# Patient Record
Sex: MALE | Race: BLACK OR AFRICAN AMERICAN | Employment: FULL TIME | URBAN - METROPOLITAN AREA
[De-identification: names, ages, dates, MRNs, and addresses within clinical notes are randomized per-mention and may not be internally consistent; named-entity substitution may affect disease eponyms.]

---

## 2017-01-25 ENCOUNTER — HOSPITAL ENCOUNTER (EMERGENCY)
Age: 32
Discharge: HOME | End: 2017-01-25
Attending: EMERGENCY MEDICINE
Payer: SELF-PAY

## 2017-01-25 VITALS
RESPIRATION RATE: 14 BRPM | HEART RATE: 94 BPM | BODY MASS INDEX: 32.34 KG/M2 | WEIGHT: 252 LBS | SYSTOLIC BLOOD PRESSURE: 142 MMHG | DIASTOLIC BLOOD PRESSURE: 77 MMHG | OXYGEN SATURATION: 98 % | TEMPERATURE: 97.9 F | HEIGHT: 74 IN

## 2017-01-25 DIAGNOSIS — L02.91 ABSCESS: Primary | ICD-10-CM

## 2017-01-25 PROCEDURE — 99282 EMERGENCY DEPT VISIT SF MDM: CPT

## 2017-01-25 RX ORDER — TRAMADOL HYDROCHLORIDE 50 MG/1
50 TABLET ORAL
Qty: 10 TAB | Refills: 0 | Status: SHIPPED | OUTPATIENT
Start: 2017-01-25 | End: 2017-04-23

## 2017-01-25 RX ORDER — SULFAMETHOXAZOLE AND TRIMETHOPRIM 800; 160 MG/1; MG/1
1 TABLET ORAL 2 TIMES DAILY
Qty: 14 TAB | Refills: 0 | Status: SHIPPED | OUTPATIENT
Start: 2017-01-25 | End: 2017-02-01

## 2017-01-25 NOTE — DISCHARGE INSTRUCTIONS

## 2017-01-25 NOTE — Clinical Note
- return for new or concerning symptoms; fever, chills, vomiting, increasing size - You may use over the counter ibuprofen (Motrin, Advil, Ibuprofen), naproxen (Aleve), or acetaminophen (Tylenol) at home as needed for mild pain, and prescribed pain medi cation for moderate to severe pain. Take prescribed pain medication with caution, as it may cause drowsiness. Do not take with alcohol, and do not take if you are going to be driving. Additionally, if the medication you are prescribed has Tylenol (ace taminophen)  in it, do not take additional Tylenol within 4 hours as you may ingest too much. - warm compresses 20 minutes at a time

## 2017-01-25 NOTE — ED PROVIDER NOTES
HPI Comments: 36year old male hx DM, prior skin infections presenting to the ED for abscess. Pt notes that he started with a painful \"bump\" to his left medial lower leg 4-5 days ago. A few days ago he applied pressure and drained a small amount of blood/pus, notes that since then the area around the bump has enlarged and become red and warm, reports a moderately severe throbbing pain when ambulating, non-radiating. No meds taken PTA. No F/C, N/V, CP, SOB, abdominal pain, or other systemic symptoms. PMHx: as above  Social: non-smoker. . Patient is a 36 y.o. male presenting with abscess. The history is provided by the patient and the spouse. Abscess           Past Medical History:   Diagnosis Date    Diabetes ([facility] [address]     Type II or unspecified type diabetes mellitus without mention of complication, uncontrolled 7/22/2011       History reviewed. No pertinent past surgical history. Family History:   Problem Relation Age of Onset    Hypertension Father     Diabetes Maternal Grandmother        Social History     Social History    Marital status:      Spouse name: N/A    Number of children: N/A    Years of education: N/A     Occupational History    Not on file. Social History Main Topics    Smoking status: Never Smoker    Smokeless tobacco: Never Used    Alcohol use No    Drug use: No    Sexual activity: Not on file     Other Topics Concern    Not on file     Social History Narrative    ** Merged History Encounter **              ALLERGIES: Review of patient's allergies indicates no known allergies. Review of Systems   Constitutional: Negative for chills, fatigue and fever. Eyes: Negative for discharge. Respiratory: Negative for shortness of breath. Cardiovascular: Negative for chest pain. Gastrointestinal: Negative for diarrhea and vomiting. Genitourinary: Negative for difficulty urinating. Musculoskeletal: Negative for neck stiffness.    Skin: Positive for color change and wound. Neurological: Negative for syncope. All other systems reviewed and are negative. Vitals:    01/25/17 1015   BP: 142/77   Pulse: 94   Resp: 14   Temp: 97.9 °F (36.6 °C)   SpO2: 98%   Weight: 114.3 kg (252 lb)   Height: 6' 2\" (1.88 m)            Physical Exam   Constitutional: He is oriented to person, place, and time. He appears well-developed and well-nourished. No distress. Pleasant, well-appearing AA male   HENT:   Head: Normocephalic and atraumatic. Right Ear: External ear normal.   Left Ear: External ear normal.   Eyes: Conjunctivae are normal. No scleral icterus. Neck: Neck supple. No tracheal deviation present. Cardiovascular: Normal rate, regular rhythm and normal heart sounds. Exam reveals no gallop and no friction rub. No murmur heard. Pulmonary/Chest: Effort normal and breath sounds normal. No stridor. No respiratory distress. He has no wheezes. Abdominal: Soft. He exhibits no distension. Musculoskeletal: Normal range of motion. Left medial lower leg: see photo. Small central area with scant purulent drainage, surrounding erythema and tenderness, no lymphangitis. No fluctuance. Neurological: He is alert and oriented to person, place, and time. Skin: Skin is warm and dry. Psychiatric: He has a normal mood and affect. His behavior is normal.   Nursing note and vitals reviewed. MDM  Number of Diagnoses or Management Options  Diagnosis management comments: 36year old male hx DM presenting for skin infection, hx same. Small area with drainage, induration, erythema, warmth, no fluctuance. Due to surrounding cellulitis, will treat with abx to cover for MRSA. Discussed elevation, warm compresses, return precautions.        Amount and/or Complexity of Data Reviewed  Discuss the patient with other providers: yes (Dr. Alicia Bah, ED attending)      ED Course       Procedures

## 2017-01-25 NOTE — ED TRIAGE NOTES
Triage Note:  Pt arrived for a boil on his left lower leg. \"I popped it but everyone says that I needed to come in and get Bactrim or something. \"

## 2017-01-26 ENCOUNTER — PATIENT OUTREACH (OUTPATIENT)
Dept: FAMILY MEDICINE CLINIC | Age: 32
End: 2017-01-26

## 2017-01-26 NOTE — PROGRESS NOTES
NN TOCED NOTE FOR:  Myriam Almanza, 36 y.o., 1985    Patient listed on [facility] sheet today for discharge from [facility] Ed for Abscess. NN unable to reach patient at home or cell number. Undersigned left message on voice mail with my contact information and sent \"Get In Touch\" letter. Need to assess for discharge needs. Patient has had five ED visits in last 12 months with last PCP visit at [facility] on 7/23/13. Chart routed to PCP for review.

## 2017-04-23 ENCOUNTER — APPOINTMENT (OUTPATIENT)
Dept: CT IMAGING | Age: 32
End: 2017-04-23
Attending: PHYSICIAN ASSISTANT
Payer: SELF-PAY

## 2017-04-23 ENCOUNTER — HOSPITAL ENCOUNTER (EMERGENCY)
Age: 32
Discharge: HOME | End: 2017-04-23
Attending: STUDENT IN AN ORGANIZED HEALTH CARE EDUCATION/TRAINING PROGRAM | Admitting: STUDENT IN AN ORGANIZED HEALTH CARE EDUCATION/TRAINING PROGRAM
Payer: SELF-PAY

## 2017-04-23 VITALS
HEART RATE: 90 BPM | SYSTOLIC BLOOD PRESSURE: 137 MMHG | BODY MASS INDEX: 32.88 KG/M2 | WEIGHT: 256.2 LBS | HEIGHT: 74 IN | RESPIRATION RATE: 16 BRPM | TEMPERATURE: 98.8 F | DIASTOLIC BLOOD PRESSURE: 80 MMHG | OXYGEN SATURATION: 99 %

## 2017-04-23 DIAGNOSIS — R73.9 HYPERGLYCEMIA: ICD-10-CM

## 2017-04-23 DIAGNOSIS — L03.211 FACIAL CELLULITIS: Primary | ICD-10-CM

## 2017-04-23 LAB
ANION GAP BLD CALC-SCNC: 8 MMOL/L (ref 5–15)
APPEARANCE UR: CLEAR
BACTERIA URNS QL MICRO: NEGATIVE /HPF
BASOPHILS # BLD AUTO: 0 K/UL (ref 0–0.1)
BASOPHILS # BLD: 0 % (ref 0–1)
BILIRUB UR QL: NEGATIVE
BUN SERPL-MCNC: 13 MG/DL (ref 6–20)
BUN/CREAT SERPL: 10 (ref 12–20)
CALCIUM SERPL-MCNC: 8.9 MG/DL (ref 8.5–10.1)
CHLORIDE SERPL-SCNC: 94 MMOL/L (ref 97–108)
CO2 SERPL-SCNC: 30 MMOL/L (ref 21–32)
COLOR UR: ABNORMAL
CREAT SERPL-MCNC: 1.29 MG/DL (ref 0.7–1.3)
EOSINOPHIL # BLD: 0.3 K/UL (ref 0–0.4)
EOSINOPHIL NFR BLD: 2 % (ref 0–7)
EPITH CASTS URNS QL MICRO: ABNORMAL /LPF
ERYTHROCYTE [DISTWIDTH] IN BLOOD BY AUTOMATED COUNT: 12.4 % (ref 11.5–14.5)
GLUCOSE BLD STRIP.AUTO-MCNC: 316 MG/DL (ref 65–100)
GLUCOSE SERPL-MCNC: 399 MG/DL (ref 65–100)
GLUCOSE UR STRIP.AUTO-MCNC: >1000 MG/DL
HCT VFR BLD AUTO: 46.5 % (ref 36.6–50.3)
HGB BLD-MCNC: 16.1 G/DL (ref 12.1–17)
HGB UR QL STRIP: NEGATIVE
HYALINE CASTS URNS QL MICRO: ABNORMAL /LPF (ref 0–5)
KETONES UR QL STRIP.AUTO: ABNORMAL MG/DL
LEUKOCYTE ESTERASE UR QL STRIP.AUTO: NEGATIVE
LYMPHOCYTES # BLD AUTO: 18 % (ref 12–49)
LYMPHOCYTES # BLD: 2.4 K/UL (ref 0.8–3.5)
MCH RBC QN AUTO: 27 PG (ref 26–34)
MCHC RBC AUTO-ENTMCNC: 34.6 G/DL (ref 30–36.5)
MCV RBC AUTO: 77.9 FL (ref 80–99)
MONOCYTES # BLD: 0.9 K/UL (ref 0–1)
MONOCYTES NFR BLD AUTO: 7 % (ref 5–13)
NEUTS SEG # BLD: 9.3 K/UL (ref 1.8–8)
NEUTS SEG NFR BLD AUTO: 73 % (ref 32–75)
NITRITE UR QL STRIP.AUTO: NEGATIVE
PH UR STRIP: 7 [PH] (ref 5–8)
PLATELET # BLD AUTO: 348 K/UL (ref 150–400)
POTASSIUM SERPL-SCNC: 4.1 MMOL/L (ref 3.5–5.1)
PROT UR STRIP-MCNC: ABNORMAL MG/DL
RBC # BLD AUTO: 5.97 M/UL (ref 4.1–5.7)
RBC #/AREA URNS HPF: ABNORMAL /HPF (ref 0–5)
SERVICE CMNT-IMP: ABNORMAL
SODIUM SERPL-SCNC: 132 MMOL/L (ref 136–145)
SP GR UR REFRACTOMETRY: 1.01 (ref 1–1.03)
UROBILINOGEN UR QL STRIP.AUTO: 1 EU/DL (ref 0.2–1)
WBC # BLD AUTO: 13 K/UL (ref 4.1–11.1)
WBC URNS QL MICRO: ABNORMAL /HPF (ref 0–4)

## 2017-04-23 PROCEDURE — 82962 GLUCOSE BLOOD TEST: CPT

## 2017-04-23 PROCEDURE — 74011250636 HC RX REV CODE- 250/636: Performed by: PHYSICIAN ASSISTANT

## 2017-04-23 PROCEDURE — 74011000258 HC RX REV CODE- 258: Performed by: STUDENT IN AN ORGANIZED HEALTH CARE EDUCATION/TRAINING PROGRAM

## 2017-04-23 PROCEDURE — 80048 BASIC METABOLIC PNL TOTAL CA: CPT | Performed by: PHYSICIAN ASSISTANT

## 2017-04-23 PROCEDURE — 81001 URINALYSIS AUTO W/SCOPE: CPT | Performed by: PHYSICIAN ASSISTANT

## 2017-04-23 PROCEDURE — 96361 HYDRATE IV INFUSION ADD-ON: CPT

## 2017-04-23 PROCEDURE — 36415 COLL VENOUS BLD VENIPUNCTURE: CPT | Performed by: PHYSICIAN ASSISTANT

## 2017-04-23 PROCEDURE — 74011636320 HC RX REV CODE- 636/320: Performed by: STUDENT IN AN ORGANIZED HEALTH CARE EDUCATION/TRAINING PROGRAM

## 2017-04-23 PROCEDURE — 99283 EMERGENCY DEPT VISIT LOW MDM: CPT

## 2017-04-23 PROCEDURE — 96360 HYDRATION IV INFUSION INIT: CPT

## 2017-04-23 PROCEDURE — 70487 CT MAXILLOFACIAL W/DYE: CPT

## 2017-04-23 PROCEDURE — 85025 COMPLETE CBC W/AUTO DIFF WBC: CPT | Performed by: PHYSICIAN ASSISTANT

## 2017-04-23 RX ORDER — SODIUM CHLORIDE 0.9 % (FLUSH) 0.9 %
10 SYRINGE (ML) INJECTION
Status: COMPLETED | OUTPATIENT
Start: 2017-04-23 | End: 2017-04-23

## 2017-04-23 RX ORDER — SULFAMETHOXAZOLE AND TRIMETHOPRIM 800; 160 MG/1; MG/1
1 TABLET ORAL 2 TIMES DAILY
Qty: 14 TAB | Refills: 0 | Status: SHIPPED | OUTPATIENT
Start: 2017-04-23 | End: 2017-04-30

## 2017-04-23 RX ORDER — CEPHALEXIN 500 MG/1
500 CAPSULE ORAL 4 TIMES DAILY
Qty: 28 CAP | Refills: 0 | Status: SHIPPED | OUTPATIENT
Start: 2017-04-23 | End: 2017-04-30

## 2017-04-23 RX ADMIN — SODIUM CHLORIDE 100 ML: 900 INJECTION, SOLUTION INTRAVENOUS at 18:43

## 2017-04-23 RX ADMIN — SODIUM CHLORIDE 1000 ML: 900 INJECTION, SOLUTION INTRAVENOUS at 17:59

## 2017-04-23 RX ADMIN — IOPAMIDOL 100 ML: 612 INJECTION, SOLUTION INTRAVENOUS at 18:43

## 2017-04-23 RX ADMIN — Medication 10 ML: at 18:43

## 2017-04-23 NOTE — ED TRIAGE NOTES
TRIAGE NOTE: patient reports abscess to right scalp for 4 days.  + swelling to right face/jaw that began today

## 2017-04-24 NOTE — DISCHARGE INSTRUCTIONS
Cellulitis: Care Instructions  Your Care Instructions    Cellulitis is a skin infection. It often occurs after a break in the skin from a scrape, cut, bite, or puncture, or after a rash. The doctor has checked you carefully, but problems can develop later. If you notice any problems or new symptoms, get medical treatment right away. Follow-up care is a key part of your treatment and safety. Be sure to make and go to all appointments, and call your doctor if you are having problems. It's also a good idea to know your test results and keep a list of the medicines you take. How can you care for yourself at home? · Take your antibiotics as directed. Do not stop taking them just because you feel better. You need to take the full course of antibiotics. · Prop up the infected area on pillows to reduce pain and swelling. Try to keep the area above the level of your heart as often as you can. · If your doctor told you how to care for your wound, follow your doctor's instructions. If you did not get instructions, follow this general advice:  ¨ Wash the wound with clean water 2 times a day. Don't use hydrogen peroxide or alcohol, which can slow healing. ¨ You may cover the wound with a thin layer of petroleum jelly, such as Vaseline, and a nonstick bandage. ¨ Apply more petroleum jelly and replace the bandage as needed. · Be safe with medicines. Take pain medicines exactly as directed. ¨ If the doctor gave you a prescription medicine for pain, take it as prescribed. ¨ If you are not taking a prescription pain medicine, ask your doctor if you can take an over-the-counter medicine. To prevent cellulitis in the future  · Try to prevent cuts, scrapes, or other injuries to your skin. Cellulitis most often occurs where there is a break in the skin. · If you get a scrape, cut, mild burn, or bite, wash the wound with clean water as soon as you can to help avoid infection.  Don't use hydrogen peroxide or alcohol, which can slow healing. · If you have swelling in your legs (edema), support stockings and good skin care may help prevent leg sores and cellulitis. · Take care of your feet, especially if you have diabetes or other conditions that increase the risk of infection. Wear shoes and socks. Do not go barefoot. If you have athlete's foot or other skin problems on your feet, talk to your doctor about how to treat them. When should you call for help? Call your doctor now or seek immediate medical care if:  · You have signs that your infection is getting worse, such as:  ¨ Increased pain, swelling, warmth, or redness. ¨ Red streaks leading from the area. ¨ Pus draining from the area. ¨ A fever. · You get a rash. Watch closely for changes in your health, and be sure to contact your doctor if:  · You are not getting better after 1 day (24 hours). · You do not get better as expected. Where can you learn more? Go to http://serjio-néstor.info/. Howie Doe in the search box to learn more about \"Cellulitis: Care Instructions. \"  Current as of: October 13, 2016  Content Version: 11.2  © 6056-2601 [employer]. Care instructions adapted under license by [employer] (which disclaims liability or warranty for this information). If you have questions about a medical condition or this instruction, always ask your healthcare professional. [place] [address] any warranty or liability for your use of this information. Learning About High Blood Sugar  What is high blood sugar? Your body turns the food you eat into glucose (sugar), which it uses for energy. But if your body isn't able to use the sugar right away, it can build up in your blood and lead to high blood sugar. When the amount of sugar in your blood stays too high for too much of the time, you may have diabetes. Diabetes is a disease that can cause serious health problems.   The good news is that lifestyle changes may help you get your blood sugar back to normal and avoid or delay diabetes. What causes high blood sugar? Sugar (glucose) can build up in your blood if you:  · Are overweight. · Have a family history of diabetes. · Take certain medicines, such as steroids. What are the symptoms? Having high blood sugar may not cause any symptoms at all. Or it may make you feel very thirsty or very hungry. You may also urinate more often than usual, have blurry vision, or lose weight without trying. How is high blood sugar treated? You can take steps to lower your blood sugar level if you understand what makes it get higher. Your doctor may want you to learn how to test your blood sugar level at home. Then you can see how illness, stress, or different kinds of food or medicine raise or lower your blood sugar level. Other tests may be needed to see if you have diabetes. How can you prevent high blood sugar? · Watch your weight. If you're overweight, losing just a small amount of weight may help. Reducing fat around your waist is most important. · Limit the amount of calories, sweets, and unhealthy fat you eat. Ask your doctor if a dietitian can help you. A registered dietitian can help you create meal plans that fit your lifestyle. · Get at least 30 minutes of exercise on most days of the week. Exercise helps control your blood sugar. It also helps you maintain a healthy weight. Walking is a good choice. You also may want to do other activities, such as running, swimming, cycling, or playing tennis or team sports. · If your doctor prescribed medicines, take them exactly as prescribed. Call your doctor if you think you are having a problem with your medicine. You will get more details on the specific medicines your doctor prescribes. Follow-up care is a key part of your treatment and safety. Be sure to make and go to all appointments, and call your doctor if you are having problems.  It's also a good idea to know your test results and keep a list of the medicines you take. Where can you learn more? Go to http://serjio-néstor.info/. Enter O108 in the search box to learn more about \"Learning About High Blood Sugar. \"  Current as of: May 23, 2016  Content Version: 11.2  © 8044-8543 [employer]. Care instructions adapted under license by [employer] (which disclaims liability or warranty for this information). If you have questions about a medical condition or this instruction, always ask your healthcare professional. [address] any warranty or liability for your use of this information. We hope that we have addressed all of your medical concerns. The examination and treatment you received in the Emergency Department were for an emergent problem and were not intended as complete care. It is important that you follow up with your healthcare provider(s) for ongoing care. If your symptoms worsen or do not improve as expected, and you are unable to reach your usual health care provider(s), you should return to the Emergency Department. Today's healthcare is undergoing tremendous change, and patient satisfaction surveys are one of the many tools to assess the quality of medical care. You may receive a survey from the [employer] regarding your experience in the Emergency Department. I hope that your experience has been completely positive, particularly the medical care that I provided. As such, please participate in the survey; anything less than excellent does not meet my expectations or intentions. [facility] and [address] [place] [address] participate in nationally recognized quality of care measures. If your blood pressure is greater than 120/80, as reported below, we urge that you seek medical care to address the potential of high blood pressure, commonly known as hypertension.    Hypertension can be hereditary or can be caused by certain medical conditions, pain, stress, or \"white coat syndrome. \"       Please make an appointment with your health care provider(s) for follow up of your Emergency Department visit. VITALS:   Patient Vitals for the past 8 hrs:   Temp Pulse Resp BP SpO2   04/23/17 2005 98.8 °F (37.1 °C) 90 16 137/80 -   04/23/17 1639 98 °F (36.7 °C) 100 16 155/87 99 %          Thank you for allowing us to provide you with medical care today. We realize that you have many choices for your emergency care needs. Please choose us in the future for any continued health care needs. Zuleika Pierce[address].   Office: [phone]            Recent Results (from the past 24 hour(s))   CBC WITH AUTOMATED DIFF    Collection Time: 04/23/17  5:36 PM   Result Value Ref Range    WBC 13.0 (H) 4.1 - 11.1 K/uL    RBC 5.97 (H) 4.10 - 5.70 M/uL    HGB 16.1 12.1 - 17.0 g/dL    HCT 46.5 36.6 - 50.3 %    MCV 77.9 (L) 80.0 - 99.0 FL    MCH 27.0 26.0 - 34.0 PG    MCHC 34.6 30.0 - 36.5 g/dL    RDW 12.4 11.5 - 14.5 %    PLATELET 242 072 - 519 K/uL    NEUTROPHILS 73 32 - 75 %    LYMPHOCYTES 18 12 - 49 %    MONOCYTES 7 5 - 13 %    EOSINOPHILS 2 0 - 7 %    BASOPHILS 0 0 - 1 %    ABS. NEUTROPHILS 9.3 (H) 1.8 - 8.0 K/UL    ABS. LYMPHOCYTES 2.4 0.8 - 3.5 K/UL    ABS. MONOCYTES 0.9 0.0 - 1.0 K/UL    ABS. EOSINOPHILS 0.3 0.0 - 0.4 K/UL    ABS.  BASOPHILS 0.0 0.0 - 0.1 K/UL   METABOLIC PANEL, BASIC    Collection Time: 04/23/17  5:36 PM   Result Value Ref Range    Sodium 132 (L) 136 - 145 mmol/L    Potassium 4.1 3.5 - 5.1 mmol/L    Chloride 94 (L) 97 - 108 mmol/L    CO2 30 21 - 32 mmol/L    Anion gap 8 5 - 15 mmol/L    Glucose 399 (H) 65 - 100 mg/dL    BUN 13 6 - 20 MG/DL    Creatinine 1.29 0.70 - 1.30 MG/DL    BUN/Creatinine ratio 10 (L) 12 - 20      GFR est AA >60 >60 ml/min/1.73m2    GFR est non-AA >60 >60 ml/min/1.73m2    Calcium 8.9 8.5 - 10.1 MG/DL   GLUCOSE, POC Collection Time: 04/23/17  7:31 PM   Result Value Ref Range    Glucose (POC) 316 (H) 65 - 100 mg/dL    Performed by Parul Bennett W/MICROSCOPIC    Collection Time: 04/23/17  7:43 PM   Result Value Ref Range    Color YELLOW/STRAW      Appearance CLEAR CLEAR      Specific gravity 1.010 1.003 - 1.030      pH (UA) 7.0 5.0 - 8.0      Protein TRACE (A) NEG mg/dL    Glucose >1000 (A) NEG mg/dL    Ketone TRACE (A) NEG mg/dL    Bilirubin NEGATIVE  NEG      Blood NEGATIVE  NEG      Urobilinogen 1.0 0.2 - 1.0 EU/dL    Nitrites NEGATIVE  NEG      Leukocyte Esterase NEGATIVE  NEG      WBC 0-4 0 - 4 /hpf    RBC 0-5 0 - 5 /hpf    Epithelial cells FEW FEW /lpf    Bacteria NEGATIVE  NEG /hpf    Hyaline cast 0-2 0 - 5 /lpf       Ct Maxillofacial W Cont    Result Date: 4/23/2017  EXAM:  CT MAXILLOFACIAL W CONT INDICATION:   Abscess of right scalp with swelling of the right face and jaw. COMPARISON:  None. CONTRAST:   100 mL of Isovue-300 TECHNIQUE:  Multislice helical CT of the facial bones was performed in the axial plane during uneventful rapid bolus intravenous contrast administration. Coronal and sagittal reformations were generated. CT dose reduction was achieved through use of a standardized protocol tailored for this examination and automatic exposure control for dose modulation. Adaptive statistical iterative reconstruction (ASIR) was utilized. FINDINGS: There is some mild soft tissue edema in the right face with no collection or soft tissue mass. There is no facial fracture or other osseous abnormality. The visualized paranasal sinuses and mastoid air cells are clear. The globes, optic nerves and extraocular muscles are normal. No abnormalities are identified within the visualized portions of the brain or nasopharynx. IMPRESSION: Right facial edema compatible with cellulitis with no abscess or other abnormality.

## 2020-06-24 ENCOUNTER — HOSPITAL ENCOUNTER (OUTPATIENT)
Age: 35
Setting detail: OBSERVATION
Discharge: HOME | End: 2020-06-27
Attending: EMERGENCY MEDICINE | Admitting: SURGERY
Payer: SELF-PAY

## 2020-06-24 DIAGNOSIS — K35.30 ACUTE APPENDICITIS WITH LOCALIZED PERITONITIS, WITHOUT PERFORATION, ABSCESS, OR GANGRENE: Primary | ICD-10-CM

## 2020-06-24 LAB
ANION GAP SERPL CALC-SCNC: 7 MMOL/L (ref 5–15)
BASOPHILS # BLD: 0.1 K/UL (ref 0–0.1)
BASOPHILS NFR BLD: 0 % (ref 0–1)
BUN SERPL-MCNC: 8 MG/DL (ref 6–20)
BUN/CREAT SERPL: 9 (ref 12–20)
CALCIUM SERPL-MCNC: 9.3 MG/DL (ref 8.5–10.1)
CHLORIDE SERPL-SCNC: 100 MMOL/L (ref 97–108)
CO2 SERPL-SCNC: 26 MMOL/L (ref 21–32)
COMMENT, HOLDF: NORMAL
CREAT SERPL-MCNC: 0.94 MG/DL (ref 0.7–1.3)
DIFFERENTIAL METHOD BLD: ABNORMAL
EOSINOPHIL # BLD: 0 K/UL (ref 0–0.4)
EOSINOPHIL NFR BLD: 0 % (ref 0–7)
ERYTHROCYTE [DISTWIDTH] IN BLOOD BY AUTOMATED COUNT: 11.9 % (ref 11.5–14.5)
GLUCOSE SERPL-MCNC: 236 MG/DL (ref 65–100)
HCT VFR BLD AUTO: 46 % (ref 36.6–50.3)
HGB BLD-MCNC: 14.9 G/DL (ref 12.1–17)
IMM GRANULOCYTES # BLD AUTO: 0.1 K/UL (ref 0–0.04)
IMM GRANULOCYTES NFR BLD AUTO: 1 % (ref 0–0.5)
LIPASE SERPL-CCNC: 87 U/L (ref 73–393)
LYMPHOCYTES # BLD: 1.4 K/UL (ref 0.8–3.5)
LYMPHOCYTES NFR BLD: 6 % (ref 12–49)
MCH RBC QN AUTO: 26.5 PG (ref 26–34)
MCHC RBC AUTO-ENTMCNC: 32.4 G/DL (ref 30–36.5)
MCV RBC AUTO: 81.7 FL (ref 80–99)
MONOCYTES # BLD: 1.3 K/UL (ref 0–1)
MONOCYTES NFR BLD: 6 % (ref 5–13)
NEUTS SEG # BLD: 19.4 K/UL (ref 1.8–8)
NEUTS SEG NFR BLD: 87 % (ref 32–75)
NRBC # BLD: 0 K/UL (ref 0–0.01)
NRBC BLD-RTO: 0 PER 100 WBC
PLATELET # BLD AUTO: 336 K/UL (ref 150–400)
PMV BLD AUTO: 10.9 FL (ref 8.9–12.9)
POTASSIUM SERPL-SCNC: 3.7 MMOL/L (ref 3.5–5.1)
RBC # BLD AUTO: 5.63 M/UL (ref 4.1–5.7)
SAMPLES BEING HELD,HOLD: NORMAL
SODIUM SERPL-SCNC: 133 MMOL/L (ref 136–145)
WBC # BLD AUTO: 22.3 K/UL (ref 4.1–11.1)

## 2020-06-24 PROCEDURE — 96375 TX/PRO/DX INJ NEW DRUG ADDON: CPT

## 2020-06-24 PROCEDURE — 83036 HEMOGLOBIN GLYCOSYLATED A1C: CPT

## 2020-06-24 PROCEDURE — 99284 EMERGENCY DEPT VISIT MOD MDM: CPT

## 2020-06-24 PROCEDURE — 96374 THER/PROPH/DIAG INJ IV PUSH: CPT

## 2020-06-24 PROCEDURE — 80048 BASIC METABOLIC PNL TOTAL CA: CPT

## 2020-06-24 PROCEDURE — 74011250636 HC RX REV CODE- 250/636: Performed by: EMERGENCY MEDICINE

## 2020-06-24 PROCEDURE — 85025 COMPLETE CBC W/AUTO DIFF WBC: CPT

## 2020-06-24 PROCEDURE — 36415 COLL VENOUS BLD VENIPUNCTURE: CPT

## 2020-06-24 PROCEDURE — 83690 ASSAY OF LIPASE: CPT

## 2020-06-24 RX ORDER — ONDANSETRON 2 MG/ML
4 INJECTION INTRAMUSCULAR; INTRAVENOUS
Status: COMPLETED | OUTPATIENT
Start: 2020-06-24 | End: 2020-06-24

## 2020-06-24 RX ADMIN — ONDANSETRON 4 MG: 2 INJECTION INTRAMUSCULAR; INTRAVENOUS at 23:41

## 2020-06-24 RX ADMIN — SODIUM CHLORIDE 1000 ML: 900 INJECTION, SOLUTION INTRAVENOUS at 23:40

## 2020-06-25 ENCOUNTER — ANESTHESIA EVENT (OUTPATIENT)
Dept: SURGERY | Age: 35
End: 2020-06-25
Payer: SELF-PAY

## 2020-06-25 ENCOUNTER — ANESTHESIA (OUTPATIENT)
Dept: SURGERY | Age: 35
End: 2020-06-25
Payer: SELF-PAY

## 2020-06-25 ENCOUNTER — APPOINTMENT (OUTPATIENT)
Dept: CT IMAGING | Age: 35
End: 2020-06-25
Attending: STUDENT IN AN ORGANIZED HEALTH CARE EDUCATION/TRAINING PROGRAM
Payer: SELF-PAY

## 2020-06-25 PROBLEM — K37 APPENDICITIS: Status: ACTIVE | Noted: 2020-06-25

## 2020-06-25 LAB
APPEARANCE UR: CLEAR
BACTERIA URNS QL MICRO: ABNORMAL /HPF
BILIRUB UR QL: NEGATIVE
COLOR UR: ABNORMAL
EPITH CASTS URNS QL MICRO: ABNORMAL /LPF
EST. AVERAGE GLUCOSE BLD GHB EST-MCNC: 278 MG/DL
GLUCOSE BLD STRIP.AUTO-MCNC: 197 MG/DL (ref 65–100)
GLUCOSE BLD STRIP.AUTO-MCNC: 212 MG/DL (ref 65–100)
GLUCOSE BLD STRIP.AUTO-MCNC: 213 MG/DL (ref 65–100)
GLUCOSE BLD STRIP.AUTO-MCNC: 253 MG/DL (ref 65–100)
GLUCOSE UR STRIP.AUTO-MCNC: NEGATIVE MG/DL
HBA1C MFR BLD: 11.3 % (ref 4–5.6)
HGB UR QL STRIP: NEGATIVE
HYALINE CASTS URNS QL MICRO: ABNORMAL /LPF (ref 0–5)
KETONES UR QL STRIP.AUTO: ABNORMAL MG/DL
LEUKOCYTE ESTERASE UR QL STRIP.AUTO: NEGATIVE
NITRITE UR QL STRIP.AUTO: NEGATIVE
PH UR STRIP: 6.5 [PH] (ref 5–8)
PROT UR STRIP-MCNC: 30 MG/DL
RBC #/AREA URNS HPF: ABNORMAL /HPF (ref 0–5)
SERVICE CMNT-IMP: ABNORMAL
SP GR UR REFRACTOMETRY: 1.02 (ref 1–1.03)
UR CULT HOLD, URHOLD: NORMAL
UROBILINOGEN UR QL STRIP.AUTO: 1 EU/DL (ref 0.2–1)
WBC URNS QL MICRO: ABNORMAL /HPF (ref 0–4)

## 2020-06-25 PROCEDURE — 74011250636 HC RX REV CODE- 250/636: Performed by: NURSE ANESTHETIST, CERTIFIED REGISTERED

## 2020-06-25 PROCEDURE — 77030008608 HC TRCR ENDOSC SMTH AMR -B: Performed by: SURGERY

## 2020-06-25 PROCEDURE — 77030009963 HC RELD STPLR ECR J&J -C: Performed by: SURGERY

## 2020-06-25 PROCEDURE — 76010000153 HC OR TIME 1.5 TO 2 HR: Performed by: SURGERY

## 2020-06-25 PROCEDURE — 99218 HC RM OBSERVATION: CPT

## 2020-06-25 PROCEDURE — 74011000250 HC RX REV CODE- 250: Performed by: NURSE ANESTHETIST, CERTIFIED REGISTERED

## 2020-06-25 PROCEDURE — 96375 TX/PRO/DX INJ NEW DRUG ADDON: CPT

## 2020-06-25 PROCEDURE — 74011000258 HC RX REV CODE- 258: Performed by: RADIOLOGY

## 2020-06-25 PROCEDURE — 88304 TISSUE EXAM BY PATHOLOGIST: CPT

## 2020-06-25 PROCEDURE — 74011250636 HC RX REV CODE- 250/636: Performed by: SURGERY

## 2020-06-25 PROCEDURE — 77030003578 HC NDL INSUF VERES AMR -B: Performed by: SURGERY

## 2020-06-25 PROCEDURE — 74011000250 HC RX REV CODE- 250: Performed by: SURGERY

## 2020-06-25 PROCEDURE — 82962 GLUCOSE BLOOD TEST: CPT

## 2020-06-25 PROCEDURE — 77030002967 HC SUT PDS J&J -B: Performed by: SURGERY

## 2020-06-25 PROCEDURE — 77030008756 HC TU IRR SUC STRY -B: Performed by: SURGERY

## 2020-06-25 PROCEDURE — 74177 CT ABD & PELVIS W/CONTRAST: CPT

## 2020-06-25 PROCEDURE — 81001 URINALYSIS AUTO W/SCOPE: CPT

## 2020-06-25 PROCEDURE — 77030011640 HC PAD GRND REM COVD -A: Performed by: SURGERY

## 2020-06-25 PROCEDURE — 77030002933 HC SUT MCRYL J&J -A: Performed by: SURGERY

## 2020-06-25 PROCEDURE — 77030040830 HC CATH URETH FOL MDII -A: Performed by: SURGERY

## 2020-06-25 PROCEDURE — 74011000258 HC RX REV CODE- 258: Performed by: STUDENT IN AN ORGANIZED HEALTH CARE EDUCATION/TRAINING PROGRAM

## 2020-06-25 PROCEDURE — 77030008606 HC TRCR ENDOSC KII AMR -B: Performed by: SURGERY

## 2020-06-25 PROCEDURE — 77030032230 HC DSCTR ULTSONC CRDLSS COVD -E: Performed by: SURGERY

## 2020-06-25 PROCEDURE — 96365 THER/PROPH/DIAG IV INF INIT: CPT

## 2020-06-25 PROCEDURE — 77030026438 HC STYL ET INTUB CARD -A: Performed by: ANESTHESIOLOGY

## 2020-06-25 PROCEDURE — 74011250636 HC RX REV CODE- 250/636: Performed by: STUDENT IN AN ORGANIZED HEALTH CARE EDUCATION/TRAINING PROGRAM

## 2020-06-25 PROCEDURE — 77030008684 HC TU ET CUF COVD -B: Performed by: ANESTHESIOLOGY

## 2020-06-25 PROCEDURE — 76060000034 HC ANESTHESIA 1.5 TO 2 HR: Performed by: SURGERY

## 2020-06-25 PROCEDURE — 74011250637 HC RX REV CODE- 250/637: Performed by: EMERGENCY MEDICINE

## 2020-06-25 PROCEDURE — 76210000016 HC OR PH I REC 1 TO 1.5 HR: Performed by: SURGERY

## 2020-06-25 PROCEDURE — 77030020829: Performed by: SURGERY

## 2020-06-25 PROCEDURE — 77030020263 HC SOL INJ SOD CL0.9% LFCR 1000ML: Performed by: SURGERY

## 2020-06-25 PROCEDURE — 77030040361 HC SLV COMPR DVT MDII -B: Performed by: SURGERY

## 2020-06-25 PROCEDURE — 77030007955 HC PCH ENDOSC SPEC J&J -B: Performed by: SURGERY

## 2020-06-25 PROCEDURE — 77030027876 HC STPLR ENDOSC FLX PWR J&J -G1: Performed by: SURGERY

## 2020-06-25 PROCEDURE — 74011636320 HC RX REV CODE- 636/320: Performed by: RADIOLOGY

## 2020-06-25 PROCEDURE — 74011250636 HC RX REV CODE- 250/636: Performed by: EMERGENCY MEDICINE

## 2020-06-25 RX ORDER — INSULIN LISPRO 100 [IU]/ML
INJECTION, SOLUTION INTRAVENOUS; SUBCUTANEOUS EVERY 6 HOURS
Status: DISCONTINUED | OUTPATIENT
Start: 2020-06-25 | End: 2020-06-27 | Stop reason: HOSPADM

## 2020-06-25 RX ORDER — BUPIVACAINE HYDROCHLORIDE AND EPINEPHRINE 2.5; 5 MG/ML; UG/ML
INJECTION, SOLUTION EPIDURAL; INFILTRATION; INTRACAUDAL; PERINEURAL AS NEEDED
Status: DISCONTINUED | OUTPATIENT
Start: 2020-06-25 | End: 2020-06-25 | Stop reason: HOSPADM

## 2020-06-25 RX ORDER — SUCCINYLCHOLINE CHLORIDE 20 MG/ML
INJECTION INTRAMUSCULAR; INTRAVENOUS AS NEEDED
Status: DISCONTINUED | OUTPATIENT
Start: 2020-06-25 | End: 2020-06-25 | Stop reason: HOSPADM

## 2020-06-25 RX ORDER — SODIUM CHLORIDE 0.9 % (FLUSH) 0.9 %
5-40 SYRINGE (ML) INJECTION EVERY 8 HOURS
Status: CANCELLED | OUTPATIENT
Start: 2020-06-25

## 2020-06-25 RX ORDER — FENTANYL CITRATE 50 UG/ML
25 INJECTION, SOLUTION INTRAMUSCULAR; INTRAVENOUS
Status: CANCELLED | OUTPATIENT
Start: 2020-06-25

## 2020-06-25 RX ORDER — SODIUM CHLORIDE, SODIUM LACTATE, POTASSIUM CHLORIDE, CALCIUM CHLORIDE 600; 310; 30; 20 MG/100ML; MG/100ML; MG/100ML; MG/100ML
100 INJECTION, SOLUTION INTRAVENOUS CONTINUOUS
Status: DISCONTINUED | OUTPATIENT
Start: 2020-06-25 | End: 2020-06-26

## 2020-06-25 RX ORDER — HYDROMORPHONE HYDROCHLORIDE 2 MG/ML
INJECTION, SOLUTION INTRAMUSCULAR; INTRAVENOUS; SUBCUTANEOUS AS NEEDED
Status: DISCONTINUED | OUTPATIENT
Start: 2020-06-25 | End: 2020-06-25 | Stop reason: HOSPADM

## 2020-06-25 RX ORDER — ACETAMINOPHEN 325 MG/1
650 TABLET ORAL ONCE
Status: DISCONTINUED | OUTPATIENT
Start: 2020-06-25 | End: 2020-06-25 | Stop reason: HOSPADM

## 2020-06-25 RX ORDER — ROCURONIUM BROMIDE 10 MG/ML
INJECTION, SOLUTION INTRAVENOUS AS NEEDED
Status: DISCONTINUED | OUTPATIENT
Start: 2020-06-25 | End: 2020-06-25 | Stop reason: HOSPADM

## 2020-06-25 RX ORDER — AMOXICILLIN AND CLAVULANATE POTASSIUM 875; 125 MG/1; MG/1
1 TABLET, FILM COATED ORAL 2 TIMES DAILY
Qty: 20 TAB | Refills: 0 | Status: SHIPPED | OUTPATIENT
Start: 2020-06-25 | End: 2020-07-05

## 2020-06-25 RX ORDER — LIDOCAINE HYDROCHLORIDE 10 MG/ML
0.1 INJECTION, SOLUTION EPIDURAL; INFILTRATION; INTRACAUDAL; PERINEURAL AS NEEDED
Status: DISCONTINUED | OUTPATIENT
Start: 2020-06-25 | End: 2020-06-25 | Stop reason: HOSPADM

## 2020-06-25 RX ORDER — MAGNESIUM SULFATE 100 %
4 CRYSTALS MISCELLANEOUS AS NEEDED
Status: DISCONTINUED | OUTPATIENT
Start: 2020-06-25 | End: 2020-06-27 | Stop reason: HOSPADM

## 2020-06-25 RX ORDER — MIDAZOLAM HYDROCHLORIDE 1 MG/ML
INJECTION, SOLUTION INTRAMUSCULAR; INTRAVENOUS AS NEEDED
Status: DISCONTINUED | OUTPATIENT
Start: 2020-06-25 | End: 2020-06-25 | Stop reason: HOSPADM

## 2020-06-25 RX ORDER — DEXAMETHASONE SODIUM PHOSPHATE 4 MG/ML
INJECTION, SOLUTION INTRA-ARTICULAR; INTRALESIONAL; INTRAMUSCULAR; INTRAVENOUS; SOFT TISSUE AS NEEDED
Status: DISCONTINUED | OUTPATIENT
Start: 2020-06-25 | End: 2020-06-25 | Stop reason: HOSPADM

## 2020-06-25 RX ORDER — ONDANSETRON 2 MG/ML
4 INJECTION INTRAMUSCULAR; INTRAVENOUS AS NEEDED
Status: CANCELLED | OUTPATIENT
Start: 2020-06-25

## 2020-06-25 RX ORDER — OXYCODONE AND ACETAMINOPHEN 5; 325 MG/1; MG/1
1-2 TABLET ORAL
Qty: 20 TAB | Refills: 0 | Status: SHIPPED | OUTPATIENT
Start: 2020-06-25 | End: 2020-06-25

## 2020-06-25 RX ORDER — MIDAZOLAM HYDROCHLORIDE 1 MG/ML
1 INJECTION, SOLUTION INTRAMUSCULAR; INTRAVENOUS AS NEEDED
Status: DISCONTINUED | OUTPATIENT
Start: 2020-06-25 | End: 2020-06-25 | Stop reason: HOSPADM

## 2020-06-25 RX ORDER — ONDANSETRON 2 MG/ML
INJECTION INTRAMUSCULAR; INTRAVENOUS AS NEEDED
Status: DISCONTINUED | OUTPATIENT
Start: 2020-06-25 | End: 2020-06-25 | Stop reason: HOSPADM

## 2020-06-25 RX ORDER — HYDROMORPHONE HYDROCHLORIDE 1 MG/ML
1 INJECTION, SOLUTION INTRAMUSCULAR; INTRAVENOUS; SUBCUTANEOUS
Status: DISCONTINUED | OUTPATIENT
Start: 2020-06-25 | End: 2020-06-27 | Stop reason: HOSPADM

## 2020-06-25 RX ORDER — LIDOCAINE HYDROCHLORIDE 20 MG/ML
INJECTION, SOLUTION EPIDURAL; INFILTRATION; INTRACAUDAL; PERINEURAL AS NEEDED
Status: DISCONTINUED | OUTPATIENT
Start: 2020-06-25 | End: 2020-06-25 | Stop reason: HOSPADM

## 2020-06-25 RX ORDER — OXYCODONE AND ACETAMINOPHEN 5; 325 MG/1; MG/1
1-2 TABLET ORAL
Qty: 20 TAB | Refills: 0 | Status: SHIPPED | OUTPATIENT
Start: 2020-06-25 | End: 2020-06-29

## 2020-06-25 RX ORDER — HYDROMORPHONE HYDROCHLORIDE 1 MG/ML
0.5 INJECTION, SOLUTION INTRAMUSCULAR; INTRAVENOUS; SUBCUTANEOUS
Status: DISCONTINUED | OUTPATIENT
Start: 2020-06-25 | End: 2020-06-26

## 2020-06-25 RX ORDER — SODIUM CHLORIDE, SODIUM LACTATE, POTASSIUM CHLORIDE, CALCIUM CHLORIDE 600; 310; 30; 20 MG/100ML; MG/100ML; MG/100ML; MG/100ML
50 INJECTION, SOLUTION INTRAVENOUS CONTINUOUS
Status: DISCONTINUED | OUTPATIENT
Start: 2020-06-25 | End: 2020-06-25 | Stop reason: HOSPADM

## 2020-06-25 RX ORDER — DEXMEDETOMIDINE HYDROCHLORIDE 100 UG/ML
INJECTION, SOLUTION INTRAVENOUS AS NEEDED
Status: DISCONTINUED | OUTPATIENT
Start: 2020-06-25 | End: 2020-06-25 | Stop reason: HOSPADM

## 2020-06-25 RX ORDER — PROPOFOL 10 MG/ML
INJECTION, EMULSION INTRAVENOUS AS NEEDED
Status: DISCONTINUED | OUTPATIENT
Start: 2020-06-25 | End: 2020-06-25 | Stop reason: HOSPADM

## 2020-06-25 RX ORDER — AMOXICILLIN AND CLAVULANATE POTASSIUM 875; 125 MG/1; MG/1
1 TABLET, FILM COATED ORAL 2 TIMES DAILY
Qty: 14 TAB | Refills: 0 | Status: SHIPPED | OUTPATIENT
Start: 2020-06-25 | End: 2020-06-25

## 2020-06-25 RX ORDER — HYDROMORPHONE HYDROCHLORIDE 1 MG/ML
1 INJECTION, SOLUTION INTRAMUSCULAR; INTRAVENOUS; SUBCUTANEOUS ONCE
Status: COMPLETED | OUTPATIENT
Start: 2020-06-25 | End: 2020-06-25

## 2020-06-25 RX ORDER — HYDROMORPHONE HYDROCHLORIDE 1 MG/ML
0.2 INJECTION, SOLUTION INTRAMUSCULAR; INTRAVENOUS; SUBCUTANEOUS
Status: CANCELLED | OUTPATIENT
Start: 2020-06-25

## 2020-06-25 RX ORDER — FENTANYL CITRATE 50 UG/ML
50 INJECTION, SOLUTION INTRAMUSCULAR; INTRAVENOUS AS NEEDED
Status: DISCONTINUED | OUTPATIENT
Start: 2020-06-25 | End: 2020-06-25 | Stop reason: HOSPADM

## 2020-06-25 RX ORDER — GLYCOPYRROLATE 0.2 MG/ML
INJECTION INTRAMUSCULAR; INTRAVENOUS AS NEEDED
Status: DISCONTINUED | OUTPATIENT
Start: 2020-06-25 | End: 2020-06-25 | Stop reason: HOSPADM

## 2020-06-25 RX ORDER — SODIUM CHLORIDE 0.9 % (FLUSH) 0.9 %
5-40 SYRINGE (ML) INJECTION AS NEEDED
Status: CANCELLED | OUTPATIENT
Start: 2020-06-25

## 2020-06-25 RX ORDER — ACETAMINOPHEN 500 MG
1000 TABLET ORAL
Status: COMPLETED | OUTPATIENT
Start: 2020-06-25 | End: 2020-06-25

## 2020-06-25 RX ORDER — NEOSTIGMINE METHYLSULFATE 1 MG/ML
INJECTION, SOLUTION INTRAVENOUS AS NEEDED
Status: DISCONTINUED | OUTPATIENT
Start: 2020-06-25 | End: 2020-06-25 | Stop reason: HOSPADM

## 2020-06-25 RX ORDER — SODIUM CHLORIDE 0.9 % (FLUSH) 0.9 %
5-40 SYRINGE (ML) INJECTION EVERY 8 HOURS
Status: DISCONTINUED | OUTPATIENT
Start: 2020-06-25 | End: 2020-06-25 | Stop reason: HOSPADM

## 2020-06-25 RX ORDER — ONDANSETRON 2 MG/ML
4 INJECTION INTRAMUSCULAR; INTRAVENOUS
Status: DISCONTINUED | OUTPATIENT
Start: 2020-06-25 | End: 2020-06-27 | Stop reason: HOSPADM

## 2020-06-25 RX ORDER — OXYCODONE AND ACETAMINOPHEN 5; 325 MG/1; MG/1
1-2 TABLET ORAL
Status: DISCONTINUED | OUTPATIENT
Start: 2020-06-25 | End: 2020-06-27 | Stop reason: HOSPADM

## 2020-06-25 RX ORDER — KETOROLAC TROMETHAMINE 30 MG/ML
30 INJECTION, SOLUTION INTRAMUSCULAR; INTRAVENOUS EVERY 6 HOURS
Status: DISCONTINUED | OUTPATIENT
Start: 2020-06-25 | End: 2020-06-27 | Stop reason: HOSPADM

## 2020-06-25 RX ORDER — ACETAMINOPHEN 325 MG/1
650 TABLET ORAL
Status: DISCONTINUED | OUTPATIENT
Start: 2020-06-25 | End: 2020-06-27 | Stop reason: HOSPADM

## 2020-06-25 RX ORDER — FENTANYL CITRATE 50 UG/ML
INJECTION, SOLUTION INTRAMUSCULAR; INTRAVENOUS AS NEEDED
Status: DISCONTINUED | OUTPATIENT
Start: 2020-06-25 | End: 2020-06-25 | Stop reason: HOSPADM

## 2020-06-25 RX ORDER — SODIUM CHLORIDE 0.9 % (FLUSH) 0.9 %
5-40 SYRINGE (ML) INJECTION AS NEEDED
Status: DISCONTINUED | OUTPATIENT
Start: 2020-06-25 | End: 2020-06-25 | Stop reason: HOSPADM

## 2020-06-25 RX ORDER — KETOROLAC TROMETHAMINE 30 MG/ML
15 INJECTION, SOLUTION INTRAMUSCULAR; INTRAVENOUS
Status: COMPLETED | OUTPATIENT
Start: 2020-06-25 | End: 2020-06-25

## 2020-06-25 RX ORDER — SODIUM CHLORIDE, SODIUM LACTATE, POTASSIUM CHLORIDE, CALCIUM CHLORIDE 600; 310; 30; 20 MG/100ML; MG/100ML; MG/100ML; MG/100ML
INJECTION, SOLUTION INTRAVENOUS
Status: DISCONTINUED | OUTPATIENT
Start: 2020-06-25 | End: 2020-06-25 | Stop reason: HOSPADM

## 2020-06-25 RX ORDER — DEXTROSE MONOHYDRATE 100 MG/ML
0-250 INJECTION, SOLUTION INTRAVENOUS AS NEEDED
Status: DISCONTINUED | OUTPATIENT
Start: 2020-06-25 | End: 2020-06-27 | Stop reason: HOSPADM

## 2020-06-25 RX ORDER — SODIUM CHLORIDE 0.9 % (FLUSH) 0.9 %
10 SYRINGE (ML) INJECTION
Status: COMPLETED | OUTPATIENT
Start: 2020-06-25 | End: 2020-06-25

## 2020-06-25 RX ADMIN — HYDROMORPHONE HYDROCHLORIDE 1 MG: 1 INJECTION, SOLUTION INTRAMUSCULAR; INTRAVENOUS; SUBCUTANEOUS at 21:07

## 2020-06-25 RX ADMIN — GLYCOPYRROLATE 0.4 MG: 0.2 INJECTION, SOLUTION INTRAMUSCULAR; INTRAVENOUS at 13:19

## 2020-06-25 RX ADMIN — SODIUM CHLORIDE, SODIUM LACTATE, POTASSIUM CHLORIDE, AND CALCIUM CHLORIDE 100 ML/HR: 600; 310; 30; 20 INJECTION, SOLUTION INTRAVENOUS at 06:29

## 2020-06-25 RX ADMIN — Medication 10 ML: at 02:07

## 2020-06-25 RX ADMIN — IOPAMIDOL 100 ML: 755 INJECTION, SOLUTION INTRAVENOUS at 02:07

## 2020-06-25 RX ADMIN — HYDROMORPHONE HYDROCHLORIDE 0.5 MG: 2 INJECTION, SOLUTION INTRAMUSCULAR; INTRAVENOUS; SUBCUTANEOUS at 13:38

## 2020-06-25 RX ADMIN — KETOROLAC TROMETHAMINE 30 MG: 30 INJECTION, SOLUTION INTRAMUSCULAR at 14:50

## 2020-06-25 RX ADMIN — HYDROMORPHONE HYDROCHLORIDE 1 MG: 1 INJECTION, SOLUTION INTRAMUSCULAR; INTRAVENOUS; SUBCUTANEOUS at 03:23

## 2020-06-25 RX ADMIN — Medication 3 MG: at 13:19

## 2020-06-25 RX ADMIN — FENTANYL CITRATE 100 MCG: 50 INJECTION, SOLUTION INTRAMUSCULAR; INTRAVENOUS at 12:09

## 2020-06-25 RX ADMIN — KETOROLAC TROMETHAMINE 15 MG: 30 INJECTION, SOLUTION INTRAMUSCULAR; INTRAVENOUS at 01:30

## 2020-06-25 RX ADMIN — PIPERACILLIN AND TAZOBACTAM 3.38 G: 3; .375 INJECTION, POWDER, LYOPHILIZED, FOR SOLUTION INTRAVENOUS at 09:17

## 2020-06-25 RX ADMIN — ONDANSETRON 4 MG: 2 INJECTION INTRAMUSCULAR; INTRAVENOUS at 21:18

## 2020-06-25 RX ADMIN — SODIUM CHLORIDE, SODIUM LACTATE, POTASSIUM CHLORIDE, AND CALCIUM CHLORIDE: 600; 310; 30; 20 INJECTION, SOLUTION INTRAVENOUS at 11:56

## 2020-06-25 RX ADMIN — SODIUM CHLORIDE 100 ML: 900 INJECTION, SOLUTION INTRAVENOUS at 02:07

## 2020-06-25 RX ADMIN — KETOROLAC TROMETHAMINE 30 MG: 30 INJECTION, SOLUTION INTRAMUSCULAR at 18:12

## 2020-06-25 RX ADMIN — PIPERACILLIN AND TAZOBACTAM 3.38 G: 3; .375 INJECTION, POWDER, LYOPHILIZED, FOR SOLUTION INTRAVENOUS at 18:12

## 2020-06-25 RX ADMIN — MIDAZOLAM 2 MG: 1 INJECTION INTRAMUSCULAR; INTRAVENOUS at 11:56

## 2020-06-25 RX ADMIN — ACETAMINOPHEN 1000 MG: 500 TABLET ORAL at 00:56

## 2020-06-25 RX ADMIN — PIPERACILLIN AND TAZOBACTAM 3.38 G: 3; .375 INJECTION, POWDER, LYOPHILIZED, FOR SOLUTION INTRAVENOUS at 02:50

## 2020-06-25 RX ADMIN — SODIUM CHLORIDE, SODIUM LACTATE, POTASSIUM CHLORIDE, AND CALCIUM CHLORIDE 100 ML/HR: 600; 310; 30; 20 INJECTION, SOLUTION INTRAVENOUS at 14:05

## 2020-06-25 RX ADMIN — DEXMEDETOMIDINE HYDROCHLORIDE 10 MCG: 100 INJECTION, SOLUTION, CONCENTRATE INTRAVENOUS at 12:19

## 2020-06-25 RX ADMIN — HYDROMORPHONE HYDROCHLORIDE 0.5 MG: 2 INJECTION, SOLUTION INTRAMUSCULAR; INTRAVENOUS; SUBCUTANEOUS at 13:24

## 2020-06-25 RX ADMIN — ONDANSETRON HYDROCHLORIDE 4 MG: 2 INJECTION, SOLUTION INTRAMUSCULAR; INTRAVENOUS at 13:14

## 2020-06-25 RX ADMIN — DEXMEDETOMIDINE HYDROCHLORIDE 10 MCG: 100 INJECTION, SOLUTION, CONCENTRATE INTRAVENOUS at 12:30

## 2020-06-25 RX ADMIN — ROCURONIUM BROMIDE 45 MG: 10 SOLUTION INTRAVENOUS at 12:15

## 2020-06-25 RX ADMIN — HYDROMORPHONE HYDROCHLORIDE 0.5 MG: 2 INJECTION, SOLUTION INTRAMUSCULAR; INTRAVENOUS; SUBCUTANEOUS at 13:45

## 2020-06-25 RX ADMIN — HYDROMORPHONE HYDROCHLORIDE 0.5 MG: 2 INJECTION, SOLUTION INTRAMUSCULAR; INTRAVENOUS; SUBCUTANEOUS at 13:27

## 2020-06-25 RX ADMIN — SUCCINYLCHOLINE CHLORIDE 200 MG: 20 INJECTION, SOLUTION INTRAMUSCULAR; INTRAVENOUS at 12:09

## 2020-06-25 RX ADMIN — ROCURONIUM BROMIDE 10 MG: 10 SOLUTION INTRAVENOUS at 12:27

## 2020-06-25 RX ADMIN — PROPOFOL 300 MG: 10 INJECTION, EMULSION INTRAVENOUS at 12:09

## 2020-06-25 RX ADMIN — LIDOCAINE HYDROCHLORIDE 100 MG: 20 INJECTION, SOLUTION EPIDURAL; INFILTRATION; INTRACAUDAL; PERINEURAL at 12:09

## 2020-06-25 RX ADMIN — DEXAMETHASONE SODIUM PHOSPHATE 4 MG: 4 INJECTION, SOLUTION INTRAMUSCULAR; INTRAVENOUS at 12:15

## 2020-06-25 RX ADMIN — ROCURONIUM BROMIDE 5 MG: 10 SOLUTION INTRAVENOUS at 12:09

## 2020-06-25 NOTE — ED TRIAGE NOTES
TRIAGE NOTE:  Patient arrives with c/o stomach pain. Patient states \"It feels gassy and bloated\". Patient reports started yesterday. Denies nausea, vomiting and diarrhea. Patient reports niece has recently had stomach bug. Patient reports taking mineral oil around 9 PM last night, and took some tyelnol, and gas-x at 1330 with minimal relief.

## 2020-06-25 NOTE — ROUTINE PROCESS
Patient: Jaqueline Chandra MRN: [id number]  SSN: [id number]   YOB: 1985  Age: 30 y.o. Sex: male     Patient is status post Procedure(s):  APPENDECTOMY LAPAROSCOPIC. Surgeon(s) and Role:     Michele George MD - Primary    Local/Dose/Irrigation:  0.25% marcaine with epi 27 ml                  Saline Lock 06/24/20 Left Antecubital (Active)   Site Assessment Clean, dry, & intact 6/25/2020  9:17 AM   Phlebitis Assessment 0 6/25/2020  9:17 AM   Infiltration Assessment 0 6/25/2020  9:17 AM   Dressing Status Clean, dry, & intact 6/25/2020  9:17 AM   Dressing Type Transparent 6/25/2020  9:17 AM   Hub Color/Line Status Pink; Infusing 6/25/2020  9:17 AM   Action Taken Open ports on tubing capped 6/25/2020  9:17 AM            Airway - Endotracheal Tube 06/25/20 Oral (Active)                   Dressing/Packing:  Wound Abdomen 3 incisions.-Dressing Type: Adhesive wound closure strips (Steri-Strips); Topical skin adhesive/glue(Mastisol) (06/25/20 8358)

## 2020-06-25 NOTE — ED PROVIDER NOTES
Patient is a 72-year-old male with history of type 2 diabetes controlled by diet presenting to emergency department for evaluation abdominal pain with onset yesterday. Pain is stated in the central upper abdomen and is described as a cramping. He also has experienced nausea, no vomiting. He notes that his family member recently had the \"stomach bug\". He denies fever, chills, sweats, chest pain, shortness of breath, diarrhea, constipation, urinary changes, or any other medical meds at this time. No treatment prior to arrival. No prior abdominal surgery. Past Medical History:   Diagnosis Date    [facility]     Type II or unspecified type diabetes mellitus without mention of complication, uncontrolled 7/22/2011       History reviewed. No pertinent surgical history.       Family History:   Problem Relation Age of Onset    Hypertension Father     Diabetes Maternal Grandmother        Social History     Socioeconomic History    Marital status:      Spouse name: Not on file    Number of children: Not on file    Years of education: Not on file    Highest education level: Not on file   Occupational History    Not on file   Social Needs    Financial resource strain: Not on file    Food insecurity     Worry: Not on file     Inability: Not on file    Transportation needs     Medical: Not on file     Non-medical: Not on file   Tobacco Use    Smoking status: Never Smoker    Smokeless tobacco: Never Used   Substance and Sexual Activity    Alcohol use: No    Drug use: No    Sexual activity: Not on file   Lifestyle    Physical activity     Days per week: Not on file     Minutes per session: Not on file    Stress: Not on file   Relationships    Social connections     Talks on phone: Not on file     Gets together: Not on file     Attends Restorationist service: Not on file     Active member of club or organization: Not on file     Attends meetings of clubs or organizations: Not on file Relationship status: Not on file    Intimate partner violence     Fear of current or ex partner: Not on file     Emotionally abused: Not on file     Physically abused: Not on file     Forced sexual activity: Not on file   Other Topics Concern    Not on file   Social History Narrative    ** Merged History Encounter **              ALLERGIES: Patient has no known allergies. Review of Systems   Constitutional: Negative for chills and fever. HENT: Negative for ear pain and sore throat. Eyes: Negative for visual disturbance. Respiratory: Negative for cough and shortness of breath. Cardiovascular: Negative for chest pain. Gastrointestinal: Positive for abdominal pain and nausea. Negative for diarrhea and vomiting. Genitourinary: Negative for flank pain. Musculoskeletal: Negative for back pain. Skin: Negative for color change. Neurological: Negative for dizziness and headaches. Psychiatric/Behavioral: Negative for confusion. Vitals:    06/24/20 2110 06/25/20 0042   BP: 137/82 130/89   Pulse: (!) 107 97   Resp: 18 18   Temp: 98 °F (36.7 °C) (!) 101 °F (38.3 °C)   SpO2: 99% 98%            Physical Exam  Vitals signs and nursing note reviewed. Constitutional:       General: He is not in acute distress. Appearance: Normal appearance. He is not ill-appearing. HENT:      Head: Normocephalic and atraumatic. Mouth/Throat:      Pharynx: Oropharynx is clear. Eyes:      Extraocular Movements: Extraocular movements intact. Conjunctiva/sclera: Conjunctivae normal.   Neck:      Musculoskeletal: No neck rigidity. Cardiovascular:      Rate and Rhythm: Normal rate and regular rhythm. Pulmonary:      Effort: Pulmonary effort is normal. No respiratory distress. Breath sounds: Normal breath sounds. No wheezing or rales. Chest:      Chest wall: No tenderness. Abdominal:      General: Bowel sounds are normal. There is no distension. Palpations: Abdomen is soft. Tenderness: There is generalized abdominal tenderness and tenderness in the right lower quadrant. There is guarding. There is no right CVA tenderness or left CVA tenderness. Positive signs include McBurney's sign. Negative signs include Koo's sign and Rovsing's sign. Musculoskeletal: Normal range of motion. Skin:     General: Skin is warm and dry. Neurological:      General: No focal deficit present. Mental Status: He is alert and oriented to person, place, and time. Psychiatric:         Mood and Affect: Mood normal.          MDM  Number of Diagnoses or Management Options  Acute appendicitis with localized peritonitis, without perforation, abscess, or gangrene:   Diagnosis management comments: Patient is alert, vitals stable, afebrile. Abdomen is soft, nondistended, and tender generalized, with worsening pain in the right lower quadrant. Nausea without vomiting. No chest pain or shortness of breath, lungs are clear to auscultation bilaterally. Differential diagnosis: Acute appendicitis, pancreatitis, diverticulitis, viral illness, hyperglycemia    Plan: CBC, CMP, lipase, CT abd/pelvis, IVFs, pain control, antiemetics, monitor, reassess        Amount and/or Complexity of Data Reviewed  Clinical lab tests: reviewed  Tests in the radiology section of CPT®: reviewed  Tests in the medicine section of CPT®: reviewed  Discuss the patient with other providers: yes (Dr. Moeller Duty attending MD)      ED Course as of Jun 25 0254   Thu Jun 25, 2020   0016 CBC WITH AUTOMATED DIFF(!):    WBC 22.3(!)   RBC 5.63   HGB 14.9   HCT 46.0   MCV 81.7   MCH 26.5   MCHC 32.4   RDW 11.9   PLATELET 391   MPV 40.4   NRBC 0.0   ABSOLUTE NRBC 0.00   NEUTROPHILS 87(!)   LYMPHOCYTES 6(!)   MONOCYTES 6   EOSINOPHILS 0   BASOPHILS 0   IMMATURE GRANULOCYTES 1(!)   ABS. NEUTROPHILS 19.4(!)   ABS. LYMPHOCYTES 1.4   ABS. MONOCYTES 1.3(!)   ABS. EOSINOPHILS 0.0   ABS. BASOPHILS 0.1   ABS. IMM.  GRANS. 0.1(!)   DF AUTOMATED []   9751 METABOLIC PANEL, BASIC(!):    Sodium 133(!)   Potassium 3.7   Chloride 100   CO2 26   Anion gap 7   Glucose 236(!)   BUN 8   Creatinine 0.94   BUN/Creatinine ratio 9(!)   GFR est AA >60   GFR est non-AA >60   Calcium 9.3 [EH]   0016 LIPASE:    Lipase 87 [EH]   0245 IMPRESSION:  Acute appendicitis with extensive inflammation right lower quadrant. No free air  or abscess. CT ABD PELV W CONT [EH]      ED Course User Index  [EH] GUILLERMO Ulloa     CONSULT NOTE:  2:54 AM Thomas Cox PA-C spoke with Dr. Pavan Fuentes, Consult for Surgery. Discussed available diagnostic tests and clinical findings. He is in agreement with care plans as outlined. Dr. Pavan Fuentes recommends admission and appendectomy tomorrow morning. Hospitalist Josy Serve for Admission  2:54 AM    ED Room Number: R31/R31  Patient Name and age: Adwoa Rogers. 30 y.o.  male  Working Diagnosis:   1. Acute appendicitis with localized peritonitis, without perforation, abscess, or gangrene        COVID-19 Suspicion:  no    Code Status:  Full Code  Readmission: no  Isolation Requirements:  no  Recommended Level of Care:  med/surg  Department:[facility] Adult ED - (21 [phone]  Other:  Acute appendicitis, IV Zosyn started, npo, surgery aware and will admit patient.      Procedures

## 2020-06-25 NOTE — ROUTINE PROCESS
TRANSFER - OUT REPORT:    Verbal report given to roberto(name) on Gus Galvin.  being transferred to (unit) for routine progression of care       Report consisted of patients Situation, Background, Assessment and   Recommendations(SBAR). Information from the following report(s) SBAR was reviewed with the receiving nurse. Lines:   Saline Lock 06/24/20 Left Antecubital (Active)        Opportunity for questions and clarification was provided.       Patient transported with:   Registered Nurse

## 2020-06-25 NOTE — BRIEF OP NOTE
Brief Postoperative Note    Patient: Cameron Lazo.   YOB: 1985  MRN: [id number]    Date of Procedure: 6/25/2020     Pre-Op Diagnosis: Appendicitis    Post-Op Diagnosis: Appendicitis     Procedure(s):  APPENDECTOMY LAPAROSCOPIC    Surgeon(s):  Armando Tony MD    Surgical Assistant: Miquel Yates    Anesthesia: General     Estimated Blood Loss (mL): 20    Complications: None    Specimens:   ID Type Source Tests Collected by Time Destination   1 : Appendix Fresh Appendix  Armando Tony MD 6/25/2020 1310 Pathology        Implants: * No implants in log *    Drains: * No LDAs found *    Findings: Markedly inflamed appendix     Electronically Signed by Saurabh Rudolph MD on 6/25/2020 at 1:30 PM

## 2020-06-25 NOTE — PROGRESS NOTES
Family update provide to the pt's wife via tel:  [phone]. All her questions were answered @ this time.

## 2020-06-25 NOTE — PERIOP NOTES
TRANSFER - OUT REPORT:    Verbal report given to Hannah Petros (name) on [facility].  being transferred to [address] [place] [address] (unit) for routine post - op       Report consisted of patients Situation, Background, Assessment and   Recommendations(SBAR). Time Pre op antibiotic [id number]  Anesthesia Stop time: 2554  Noyola Present on Transfer to floor:N/A  Order for Noyola on Chart:N/A  Discharge Prescriptions with Chart:N/A    Information from the following report(s) SBAR, OR Summary, Procedure Summary, Intake/Output and MAR was reviewed with the receiving nurse. Opportunity for questions and clarification was provided. Is the patient on 02? NO       L/Min RA       Other N/A    Is the patient on a monitor? NO    Is the nurse transporting with the patient? NO    Surgical Waiting Area notified of patient's transfer from PACU? NO      The following personal items collected during your admission accompanied patient upon transfer:   Dental Appliance: Dental Appliances: None  Vision: Visual Aid: None  Hearing Aid:    Jewelry: Jewelry: Watch  Clothing: Clothing: Pants, Shirt, Socks  Other Valuables:  Other Valuables: Cell Phone, Wallet(2 cell phones)  Valuables sent to safe:

## 2020-06-25 NOTE — ANESTHESIA PREPROCEDURE EVALUATION
Relevant Problems   No relevant active problems       Anesthetic History   No history of anesthetic complications            Review of Systems / Medical History  Patient summary reviewed, nursing notes reviewed and pertinent labs reviewed    Pulmonary  Within defined limits                 Neuro/Psych   Within defined limits           Cardiovascular  Within defined limits                Exercise tolerance: >4 METS     GI/Hepatic/Renal  Within defined limits              Endo/Other    Diabetes         Other Findings              Physical Exam    Airway  Mallampati: II  TM Distance: 4 - 6 cm  Neck ROM: normal range of motion   Mouth opening: Normal     Cardiovascular    Rhythm: regular  Rate: normal         Dental  No notable dental hx       Pulmonary  Breath sounds clear to auscultation               Abdominal  Abdominal exam normal       Other Findings            Anesthetic Plan    ASA: 2  Anesthesia type: general          Induction: Intravenous  Anesthetic plan and risks discussed with: Patient

## 2020-06-25 NOTE — PROGRESS NOTES
Bedside shift change report given to Hannah Morrell RN (oncoming nurse) by Ashley Damon RN (offgoing nurse). Report included the following information SBAR, Kardex, ED Summary, Intake/Output and MAR.

## 2020-06-25 NOTE — H&P
Surgery History and Physical    Subjective: Randy Roche is a 30 y.o. male who began having abdominal discomfort about 2 days ago. He had been exposed to a niece that had been sick and thought it was related. He tried exlax and pepto and it didn't work. The pain moved from the right midabdomen down to the RLQ. He took his temp a d it was elevated to he came to the hospital.. He has never had anything similar in the past. }. Patient Active Problem List    Diagnosis Date Noted    Appendicitis 06/25/2020    Non-compliant patient 07/23/2013    Type II or unspecified type diabetes mellitus without mention of complication, uncontrolled 07/22/2011     Past Medical History:   Diagnosis Date    [facility] ([facility]     Type II or unspecified type diabetes mellitus without mention of complication, uncontrolled 7/22/2011      History reviewed. No pertinent surgical history. Social History     Tobacco Use    Smoking status: Never Smoker    Smokeless tobacco: Never Used   Substance Use Topics    Alcohol use: No      Family History   Problem Relation Age of Onset    Hypertension Father     Diabetes Maternal Grandmother       Prior to Admission medications    Not on File     No Known Allergies     Review of Systems:    Pertinent items are noted in the History of Present Illness. Objective:     Visit Vitals  /78   Pulse 76   Temp 97.9 °F (36.6 °C)   Resp 18   SpO2 98%       Physical Exam:    GENERAL: alert, cooperative, no distress, appears stated age, EYE: negative, , LUNG: clear to auscultation bilaterally, HEART: regular rate and rhythm, ABDOMEN: soft with tenderness in the RLQ with rebound. , EXTREMITIES:  no edema, SKIN: Normal., NEUROLOGIC: negative, PSYCH: non focal    Imaging:  images and reports reviewed  CT- Acute appendicitis with extensive inflammation right lower quadrant. No free air  or abscess.   Lab Review:    Recent Results (from the past 24 hour(s))   CBC WITH AUTOMATED DIFF Collection Time: 06/24/20  9:30 PM   Result Value Ref Range    WBC 22.3 (H) 4.1 - 11.1 K/uL    RBC 5.63 4.10 - 5.70 M/uL    HGB 14.9 12.1 - 17.0 g/dL    HCT 46.0 36.6 - 50.3 %    MCV 81.7 80.0 - 99.0 FL    MCH 26.5 26.0 - 34.0 PG    MCHC 32.4 30.0 - 36.5 g/dL    RDW 11.9 11.5 - 14.5 %    PLATELET 507 347 - 771 K/uL    MPV 10.9 8.9 - 12.9 FL    NRBC 0.0 0  WBC    ABSOLUTE NRBC 0.00 0.00 - 0.01 K/uL    NEUTROPHILS 87 (H) 32 - 75 %    LYMPHOCYTES 6 (L) 12 - 49 %    MONOCYTES 6 5 - 13 %    EOSINOPHILS 0 0 - 7 %    BASOPHILS 0 0 - 1 %    IMMATURE GRANULOCYTES 1 (H) 0.0 - 0.5 %    ABS. NEUTROPHILS 19.4 (H) 1.8 - 8.0 K/UL    ABS. LYMPHOCYTES 1.4 0.8 - 3.5 K/UL    ABS. MONOCYTES 1.3 (H) 0.0 - 1.0 K/UL    ABS. EOSINOPHILS 0.0 0.0 - 0.4 K/UL    ABS. BASOPHILS 0.1 0.0 - 0.1 K/UL    ABS. IMM. GRANS. 0.1 (H) 0.00 - 0.04 K/UL    DF AUTOMATED     METABOLIC PANEL, BASIC    Collection Time: 06/24/20  9:30 PM   Result Value Ref Range    Sodium 133 (L) 136 - 145 mmol/L    Potassium 3.7 3.5 - 5.1 mmol/L    Chloride 100 97 - 108 mmol/L    CO2 26 21 - 32 mmol/L    Anion gap 7 5 - 15 mmol/L    Glucose 236 (H) 65 - 100 mg/dL    BUN 8 6 - 20 MG/DL    Creatinine 0.94 0.70 - 1.30 MG/DL    BUN/Creatinine ratio 9 (L) 12 - 20      GFR est AA >60 >60 ml/min/1.73m2    GFR est non-AA >60 >60 ml/min/1.73m2    Calcium 9.3 8.5 - 10.1 MG/DL   SAMPLES BEING HELD    Collection Time: 06/24/20  9:30 PM   Result Value Ref Range    SAMPLES BEING HELD 1 red, 1 blue      COMMENT        Add-on orders for these samples will be processed based on acceptable specimen integrity and analyte stability, which may vary by analyte. LIPASE    Collection Time: 06/24/20  9:30 PM   Result Value Ref Range    Lipase 87 73 - 393 U/L   URINE CULTURE HOLD SAMPLE    Collection Time: 06/25/20  1:37 AM   Result Value Ref Range    Urine culture hold        Urine on hold in Microbiology dept for 2 days.   If unpreserved urine is submitted, it cannot be used for addtional testing after 24 hours, recollection will be required. URINALYSIS W/MICROSCOPIC    Collection Time: 06/25/20  1:37 AM   Result Value Ref Range    Color YELLOW/STRAW      Appearance CLEAR CLEAR      Specific gravity 1.017 1.003 - 1.030      pH (UA) 6.5 5.0 - 8.0      Protein 30 (A) NEG mg/dL    Glucose Negative NEG mg/dL    Ketone TRACE (A) NEG mg/dL    Bilirubin Negative NEG      Blood Negative NEG      Urobilinogen 1.0 0.2 - 1.0 EU/dL    Nitrites Negative NEG      Leukocyte Esterase Negative NEG      WBC 0-4 0 - 4 /hpf    RBC 0-5 0 - 5 /hpf    Epithelial cells FEW FEW /lpf    Bacteria 3+ (A) NEG /hpf    Hyaline cast 0-2 0 - 5 /lpf   GLUCOSE, POC    Collection Time: 06/25/20  6:12 AM   Result Value Ref Range    Glucose (POC) 212 (H) 65 - 100 mg/dL    Performed by Sylvie Edwards (PCT)        Assessment:   Acute appendicitis. Plan:     1. I recommend proceeding with Surgery:  Appendectomy and Laparoscopy. 2. Discussed aspects of surgical intervention, methods, risks (including by not limited to infection, bleeding, hematoma, and perforation of the intestines or solid organs), and the risks of general anesthetic. The patient understands the risks; any and all questions were answered to the patient's satisfaction. 3- Diabetes- Does not seem controlled - will get consult from DM management.

## 2020-06-25 NOTE — PROGRESS NOTES
Patient and the wife told me that they were not interested in taking any insulin, whether it is Lispro or NPH because he is tired of changing his routine and wants to, \"keep doing what he is doing\". RN stressed that this was not the best choice, especially after surgery, and he stated that he will just continue doing what he is doing. RN notified the diabetic educator and she stated that she will talk to the patient and his wife tomorrow again. [phone]: RN talked with patient about taking insulin if his blood sugar were to become dangerously high. Patient declined this and stated, \"it probably won't get that high\". RN educated him that his blood sugar will be higher due to his surgery and receiving a steroid. He was not concerned about this and refused both his NPH and insulin anyway.

## 2020-06-25 NOTE — PROGRESS NOTES
Admission Medication Reconciliation:    Information obtained from:  Patient  RxQuery data available¹:  NO    Comments/Recommendations: Spoke with patient via secure phone call to patient's room. Discussed use of PTA medications including prescription/OTC, vitamins/supplements, inhaled, topical, nasal, injectable, otic and ophthalmic medications. Updated PTA meds/reviewed patient's allergies. 1)  Patient denied current use of any medications. 2)  Medication changes (since last review): Added/Adjusted/Removed  - None     ¹RxQuery pharmacy benefit data reflects medications filled and processed through the patient's insurance, however   this data does NOT capture whether the medication was picked up or is currently being taken by the patient. Allergies:  Patient has no known allergies. Significant PMH/Disease States:   Past Medical History:   Diagnosis Date    Diabetes ([facility]     Type II or unspecified type diabetes mellitus without mention of complication, uncontrolled 7/22/2011     Chief Complaint for this Admission:    Chief Complaint   Patient presents with    Abdominal Pain     Prior to Admission Medications:   None       Please contact the main inpatient pharmacy with any questions or concerns at [phone] and we will direct you to the clinical pharmacist covering this patient's care while in-house.    Raymona Duane, [facility]

## 2020-06-25 NOTE — DIABETES MGMT
NEERU DICKINSON  CLINICAL NURSE SPECIALIST CONSULT  PROGRAM FOR DIABETES HEALTH    INITIAL NOTE    Presentation   Rosey Hernandez is a 30 y.o. male with a past medical history of type two diabetes who presented to the ED with a one day c/o abdominal pain and was admitted with acute appendicitis with localized peritonitis without perforation. Now s/p appendectomy. Diabetes: Patient has a 7 year history of known type two diabetes, diet controlled PTA. Admitted for [address] in 2013 and was diagnosed with diabetes. Started on metformin and \"another medication\". He felt sluggish, drowsy and \"out of it\". He decided to discontinue the medications one year later and monitor food intake. Family history positive for diabetes. Consulted by Provider for advanced diabetes nursing assessment and care, specifically related to   [x] Inpatient management strategy  [x] Home management assessment      Diabetes-related medical history  Acute complications: Hyperglycemia without acidosis  Neurological complications  Peripheral neuropathy  Microvascular disease: None  Macrovascular disease: None    Diabetes medication history  Drug class Currently in use Discontinued Never used   Biguanide  Metformin  Dose unknown    DDP-4 inhibitor       Sulfonylurea      Thiazolidinedione      GLP-1 RA      SGLT-2 inhibitors      Basal insulin      Fixed Dose  Combinations      Bolus insulin        Subjective   I have controlled my diabetes with my food intake.     Patient reports the following home diabetes self-care practices:  Eating pattern  [] Breakfast Fruit juice shake  [] Lunch  Salad with corn and vegetables  [] Dinner  [employer] and fruit  [] Bedtime None  [] Snacks Crackers  [] Beverages Water and gatorade  Physical activity pattern  [] Active at work  Monitoring pattern  Does not monitor glucose      Objective   Physical exam  General Alert, oriented and in no acute distress/ill-appearing. Conversant and cooperative.    Vital Signs   Visit Vitals  /78   Pulse 76   Temp 97.9 °F (36.6 °C)   Resp 18   SpO2 98%     Skin  Warm and dry. Acanthosis noted along neckline. Heart   Regular rate and rhythm. No murmurs, rubs or gallops  Lungs  Clear to auscultation without rales or rhonchi  Extremities No foot wounds    Diabetic foot exam:    Left Foot     Visual Exam: normal    Pulse DP: 2+ (normal)   Filament test: reduced sensation    Vibratory sensation: diminished  Right Foot   Visual Exam: normal    Pulse DP: 2+ (normal)   Filament test: reduced sensation    Vibratory sensation: diminished DP & PT pulses +2. Laboratory  Lab Results   Component Value Date/Time    Hemoglobin A1c 14.9 (H) 07/22/2011 12:12 PM    Hemoglobin A1c (POC) 14 07/16/2013 03:03 PM     No results found for: LDL, LDLC, DLDLP  Lab Results   Component Value Date/Time    Creatinine 0.94 06/24/2020 09:30 PM     Lab Results   Component Value Date/Time    Sodium 133 (L) 06/24/2020 09:30 PM    Potassium 3.7 06/24/2020 09:30 PM    Chloride 100 06/24/2020 09:30 PM    CO2 26 06/24/2020 09:30 PM    Anion gap 7 06/24/2020 09:30 PM    Glucose 236 (H) 06/24/2020 09:30 PM    BUN 8 06/24/2020 09:30 PM    Creatinine 0.94 06/24/2020 09:30 PM    BUN/Creatinine ratio 9 (L) 06/24/2020 09:30 PM    GFR est AA >60 06/24/2020 09:30 PM    GFR est non-AA >60 06/24/2020 09:30 PM    Calcium 9.3 06/24/2020 09:30 PM    Bilirubin, total 0.4 07/22/2011 12:12 PM    Alk.  phosphatase 98 07/22/2011 12:12 PM    Protein, total 7.3 07/22/2011 12:12 PM    Albumin 4.3 07/22/2011 12:12 PM    Globulin 4.2 (H) 07/19/2011 01:17 PM    A-G Ratio 1.4 07/22/2011 12:12 PM    ALT (SGPT) 69 (H) 07/22/2011 12:12 PM     Lab Results   Component Value Date/Time    ALT (SGPT) 69 (H) 07/22/2011 12:12 PM       Blood glucose pattern  197-236      Assessment and Plan   Nursing Diagnosis Risk for unstable blood glucose pattern   Nursing Intervention Domain 9342 Decision-making Support   Nursing Interventions Examined current inpatient diabetes control   Explored factors facilitating and impeding inpatient management  Identified self-management practices impeding diabetes control  Explored corrective strategies with patient and responsible inpatient provider   Informed patient of rational for insulin strategy while hospitalized  Instructed patient in:  A1C value and goal  Normal blood glucose values and goal  Diabetes related complications  Basic nutrition and foods that can impact blood glucose  Need to re-start antihyperglycemic agents     Evaluation   Rosa Wang. is a 30 y.o. male with a uncontrolled type two diabetes, not on antihyperglycemic agents, presented to the ED with a one day c/o abdominal pain and was admitted with acute appendicitis with localized peritonitis without perforation. Now s/p appendectomy. His A1C is significantly elevated at 11.3% with inpatient blood glucose above target of 100-180mg/dl. Blood glucose monitoring with in the inpatient setting is necessary to ensure optimal glucose control in the post-operative period. Recommendations   Recommend:  Initiate the subcutaneous insulin order set with:  1. Low dose basal insulin: 0.1 units/kg/dose: 12 units NPH BID. First dose now    2. Correctional insulin ACHS at normal sensitivity, start at a glucose of 200    3. POC glucose ACHS    4. Consistent Carbohydrate diet    5. If patient is experiencing pre-prandial hyperglycemia over 200 on basal and correctional insulin, add Low dose bolus insulin. 0.05 units/kg/meal. Hold if patient consumes less than 50% of carbohydrates on meal tray      On Discharge:  1. Patient will need to return to his PCP to re-establish care for diabetes, including re-starting antihyperglycemic agents. - NPH 12 units BID (Relion NPH vial at [facility] is $25)    - Metformin 500 mg: Start with 500 mg Daily; monitor for GI side effects. Side effects should resolve after 5-7 days.   If no GI side effects- advance dose by 1 tablet every 5-7 days to a total dose of 1000 mg BID. 2. Patient instructed to use home glucometer meter to start re-checking glucose before meals and at bedtime. He is to create a log and present to his PCP. Billing Code(s)     Thank you for including us in their care. I spent 45 minutes in direct patient care today for this patient.   Time includes chart review, face to face with patient and collaboration with interdisciplinary care team.        Milly Villagran, [facility]  Program for Diabetes Health  Access via GRAEME Akhtar [phone]

## 2020-06-26 LAB
ANION GAP SERPL CALC-SCNC: 7 MMOL/L (ref 5–15)
BASOPHILS # BLD: 0 K/UL (ref 0–0.1)
BASOPHILS NFR BLD: 0 % (ref 0–1)
BUN SERPL-MCNC: 10 MG/DL (ref 6–20)
BUN/CREAT SERPL: 12 (ref 12–20)
CALCIUM SERPL-MCNC: 8.4 MG/DL (ref 8.5–10.1)
CHLORIDE SERPL-SCNC: 105 MMOL/L (ref 97–108)
CO2 SERPL-SCNC: 24 MMOL/L (ref 21–32)
CREAT SERPL-MCNC: 0.82 MG/DL (ref 0.7–1.3)
DIFFERENTIAL METHOD BLD: ABNORMAL
EOSINOPHIL # BLD: 0 K/UL (ref 0–0.4)
EOSINOPHIL NFR BLD: 0 % (ref 0–7)
ERYTHROCYTE [DISTWIDTH] IN BLOOD BY AUTOMATED COUNT: 12.3 % (ref 11.5–14.5)
GLUCOSE BLD STRIP.AUTO-MCNC: 146 MG/DL (ref 65–100)
GLUCOSE BLD STRIP.AUTO-MCNC: 198 MG/DL (ref 65–100)
GLUCOSE SERPL-MCNC: 188 MG/DL (ref 65–100)
HCT VFR BLD AUTO: 39.5 % (ref 36.6–50.3)
HGB BLD-MCNC: 12.7 G/DL (ref 12.1–17)
IMM GRANULOCYTES # BLD AUTO: 0.1 K/UL (ref 0–0.04)
IMM GRANULOCYTES NFR BLD AUTO: 1 % (ref 0–0.5)
LYMPHOCYTES # BLD: 1.4 K/UL (ref 0.8–3.5)
LYMPHOCYTES NFR BLD: 8 % (ref 12–49)
MAGNESIUM SERPL-MCNC: 1.8 MG/DL (ref 1.6–2.4)
MCH RBC QN AUTO: 26.3 PG (ref 26–34)
MCHC RBC AUTO-ENTMCNC: 32.2 G/DL (ref 30–36.5)
MCV RBC AUTO: 81.8 FL (ref 80–99)
MONOCYTES # BLD: 1.6 K/UL (ref 0–1)
MONOCYTES NFR BLD: 9 % (ref 5–13)
NEUTS SEG # BLD: 15.4 K/UL (ref 1.8–8)
NEUTS SEG NFR BLD: 82 % (ref 32–75)
NRBC # BLD: 0 K/UL (ref 0–0.01)
NRBC BLD-RTO: 0 PER 100 WBC
PHOSPHATE SERPL-MCNC: 2.8 MG/DL (ref 2.6–4.7)
PLATELET # BLD AUTO: 323 K/UL (ref 150–400)
PMV BLD AUTO: 10.6 FL (ref 8.9–12.9)
POTASSIUM SERPL-SCNC: 3.8 MMOL/L (ref 3.5–5.1)
RBC # BLD AUTO: 4.83 M/UL (ref 4.1–5.7)
SERVICE CMNT-IMP: ABNORMAL
SERVICE CMNT-IMP: ABNORMAL
SODIUM SERPL-SCNC: 136 MMOL/L (ref 136–145)
WBC # BLD AUTO: 18.5 K/UL (ref 4.1–11.1)

## 2020-06-26 PROCEDURE — 82962 GLUCOSE BLOOD TEST: CPT

## 2020-06-26 PROCEDURE — 84100 ASSAY OF PHOSPHORUS: CPT

## 2020-06-26 PROCEDURE — 74011250636 HC RX REV CODE- 250/636: Performed by: STUDENT IN AN ORGANIZED HEALTH CARE EDUCATION/TRAINING PROGRAM

## 2020-06-26 PROCEDURE — 74011250636 HC RX REV CODE- 250/636: Performed by: SURGERY

## 2020-06-26 PROCEDURE — 36415 COLL VENOUS BLD VENIPUNCTURE: CPT

## 2020-06-26 PROCEDURE — 74011250637 HC RX REV CODE- 250/637: Performed by: SURGERY

## 2020-06-26 PROCEDURE — 96376 TX/PRO/DX INJ SAME DRUG ADON: CPT

## 2020-06-26 PROCEDURE — 99218 HC RM OBSERVATION: CPT

## 2020-06-26 PROCEDURE — 74011000258 HC RX REV CODE- 258: Performed by: STUDENT IN AN ORGANIZED HEALTH CARE EDUCATION/TRAINING PROGRAM

## 2020-06-26 PROCEDURE — 85025 COMPLETE CBC W/AUTO DIFF WBC: CPT

## 2020-06-26 PROCEDURE — 80048 BASIC METABOLIC PNL TOTAL CA: CPT

## 2020-06-26 PROCEDURE — 83735 ASSAY OF MAGNESIUM: CPT

## 2020-06-26 RX ORDER — HYDROMORPHONE HYDROCHLORIDE 1 MG/ML
0.5 INJECTION, SOLUTION INTRAMUSCULAR; INTRAVENOUS; SUBCUTANEOUS
Status: DISCONTINUED | OUTPATIENT
Start: 2020-06-26 | End: 2020-06-27 | Stop reason: HOSPADM

## 2020-06-26 RX ADMIN — PIPERACILLIN AND TAZOBACTAM 3.38 G: 3; .375 INJECTION, POWDER, LYOPHILIZED, FOR SOLUTION INTRAVENOUS at 09:52

## 2020-06-26 RX ADMIN — OXYCODONE HYDROCHLORIDE AND ACETAMINOPHEN 2 TABLET: 5; 325 TABLET ORAL at 10:29

## 2020-06-26 RX ADMIN — KETOROLAC TROMETHAMINE 30 MG: 30 INJECTION, SOLUTION INTRAMUSCULAR at 23:09

## 2020-06-26 RX ADMIN — KETOROLAC TROMETHAMINE 30 MG: 30 INJECTION, SOLUTION INTRAMUSCULAR at 12:06

## 2020-06-26 RX ADMIN — PIPERACILLIN AND TAZOBACTAM 3.38 G: 3; .375 INJECTION, POWDER, LYOPHILIZED, FOR SOLUTION INTRAVENOUS at 18:35

## 2020-06-26 RX ADMIN — OXYCODONE HYDROCHLORIDE AND ACETAMINOPHEN 2 TABLET: 5; 325 TABLET ORAL at 23:08

## 2020-06-26 RX ADMIN — KETOROLAC TROMETHAMINE 30 MG: 30 INJECTION, SOLUTION INTRAMUSCULAR at 00:39

## 2020-06-26 RX ADMIN — OXYCODONE HYDROCHLORIDE AND ACETAMINOPHEN 1 TABLET: 5; 325 TABLET ORAL at 06:34

## 2020-06-26 RX ADMIN — KETOROLAC TROMETHAMINE 30 MG: 30 INJECTION, SOLUTION INTRAMUSCULAR at 18:35

## 2020-06-26 RX ADMIN — SODIUM CHLORIDE, SODIUM LACTATE, POTASSIUM CHLORIDE, AND CALCIUM CHLORIDE 100 ML/HR: 600; 310; 30; 20 INJECTION, SOLUTION INTRAVENOUS at 00:43

## 2020-06-26 RX ADMIN — KETOROLAC TROMETHAMINE 30 MG: 30 INJECTION, SOLUTION INTRAMUSCULAR at 05:21

## 2020-06-26 RX ADMIN — PIPERACILLIN AND TAZOBACTAM 3.38 G: 3; .375 INJECTION, POWDER, LYOPHILIZED, FOR SOLUTION INTRAVENOUS at 00:39

## 2020-06-26 RX ADMIN — OXYCODONE HYDROCHLORIDE AND ACETAMINOPHEN 2 TABLET: 5; 325 TABLET ORAL at 18:35

## 2020-06-26 RX ADMIN — OXYCODONE HYDROCHLORIDE AND ACETAMINOPHEN 2 TABLET: 5; 325 TABLET ORAL at 14:36

## 2020-06-26 RX ADMIN — HYDROMORPHONE HYDROCHLORIDE 1 MG: 1 INJECTION, SOLUTION INTRAMUSCULAR; INTRAVENOUS; SUBCUTANEOUS at 05:21

## 2020-06-26 NOTE — ROUTINE PROCESS
Primary Nurse Gerardo Garvey RN and Kermit Arceo RN performed a dual skin assessment on this patient No impairment noted  Lavell score is 22

## 2020-06-26 NOTE — DIABETES MGMT
NEERU DICKINSON  CLINICAL NURSE SPECIALIST CONSULT  PROGRAM FOR DIABETES HEALTH    FOLLOW-UP NOTE    Presentation   Alicia Ortega. is a 30 y.o. male with a past medical history of type two diabetes who presented to the ED with a one day c/o abdominal pain and was admitted with acute appendicitis with localized peritonitis without perforation. Now s/p appendectomy. Subjective   I talked with my wife and I have an appointment with my family physician.     POD 1 appendectomy  Refused NPH and correctional insulin  Glucose 198-253    Objective   Physical exam  General Alert, oriented and in no acute distress/ill-appearing. Conversant and cooperative. Vital Signs   Visit Vitals  /79   Pulse 100   Temp 98.8 °F (37.1 °C)   Resp 18   SpO2 95%     Skin  Warm and dry  Heart   Regular rate and rhythm. No murmurs, rubs or gallops  Lungs  Clear to auscultation without rales or rhonchi  Extremities No foot wounds    Laboratory  Lab Results   Component Value Date/Time    Hemoglobin A1c 11.3 (H) 06/24/2020 09:30 PM    Hemoglobin A1c (POC) 14 07/16/2013 03:03 PM     No results found for: LDL, LDLC, DLDLP  Lab Results   Component Value Date/Time    Creatinine 0.82 06/26/2020 02:37 AM     Lab Results   Component Value Date/Time    Sodium 136 06/26/2020 02:37 AM    Potassium 3.8 06/26/2020 02:37 AM    Chloride 105 06/26/2020 02:37 AM    CO2 24 06/26/2020 02:37 AM    Anion gap 7 06/26/2020 02:37 AM    Glucose 188 (H) 06/26/2020 02:37 AM    BUN 10 06/26/2020 02:37 AM    Creatinine 0.82 06/26/2020 02:37 AM    BUN/Creatinine ratio 12 06/26/2020 02:37 AM    GFR est AA >60 06/26/2020 02:37 AM    GFR est non-AA >60 06/26/2020 02:37 AM    Calcium 8.4 (L) 06/26/2020 02:37 AM    Bilirubin, total 0.4 07/22/2011 12:12 PM    Alk.  phosphatase 98 07/22/2011 12:12 PM    Protein, total 7.3 07/22/2011 12:12 PM    Albumin 4.3 07/22/2011 12:12 PM    Globulin 4.2 (H) 07/19/2011 01:17 PM    A-G Ratio 1.4 07/22/2011 12:12 PM    ALT (SGPT) 69 (H) 07/22/2011 12:12 PM     Lab Results   Component Value Date/Time    ALT (SGPT) 69 (H) 07/22/2011 12:12 PM       Factors affecting BG pattern  Factor Dose Comments   Nutrition:  Carb-controlled meals   60 grams/meal    Drugs:  Steroids Decadron     Pain S/p surgery      Blood glucose pattern      Assessment and Plan   Nursing Diagnosis Risk for unstable blood glucose pattern   Nursing Intervention Domain 8657 Decision-making Support   Nursing Interventions Examined current inpatient diabetes control   Explored factors facilitating and impeding inpatient management   Diabetes Education Checklist    Pathophysiology  [x] Diabetes basics  [x] Differences between type 1 and type 2     Diagnostic Criteria  [x] Interpretation of Labs  [x] Hemoglobin A1c  [x] Blood glucose goals  Notes: 11.3% A1C, glucose currently 198-250. Goal A1C 7-7.5%,     Blood Glucose Monitoring  [x] Using a glucometer  [x] When to check BG  [x] Record keeping  Notes: Glucose goal 100-150. Patient to obtain a glucose before meals and bedtime. Make a log and present to his PCP    Insulin  [x] Long-acting insulin  [x] Rapid-acting insulin    Physical Activity & Exercise  [x] Review of current routine  [x] Impact on BG levels    Nutrition Basics  [x] Starter tips for meal planning  [x] Meal & snack schedule  [x] Reading food labels  [x] Balanced plate method  [x] How different foods impact BG levels  [x] Carbohydrate food sources        [x] Low carb meal & snack options           Reducing Risks  [x] Long-term complications of diabetes  [x] Health Maintenance    Evaluation   Peter Gould. is a 30 y.o. male with a uncontrolled type two diabetes, not on antihyperglycemic agents, presented to the ED with a one day c/o abdominal pain and was admitted with acute appendicitis with localized peritonitis without perforation. Now s/p appendectomy. His A1C is significantly elevated at 11.3% with inpatient blood glucose above target of 100-180mg/dl. Blood glucose monitoring with in the inpatient setting is necessary to ensure optimal glucose control in the post-operative period. Patient concerned about using anti-hyperglycemic agents due to side effects. There is a disconnect between diabetes exclusive control with diet and exercise and current glucose values. At this time, his efforts for diet and exercise are not sufficient to control diabetes with A1C of 11.3% and glucose values in the 200s. Patient understands his risk for diabetes associated complications and impact of glucose on surgical recovery. Unfortunately, he is unwilling to take any medications in the inpatient setting to optimize blood glucose control. Recommendations   Recommend:  1. Low dose basal insulin: 0.1 units/kg/dose: 12 units NPH BID. First dose now     2. Correctional insulin ACHS at normal sensitivity, start at a glucose of 200     3. POC glucose ACHS     4. Consistent Carbohydrate diet        On Discharge:  1. Patient will need to return to his PCP to re-establish care for diabetes, including re-starting antihyperglycemic agents. His wife has scheduled a PCP visit.     2. Patient instructed to use home glucometer meter to start re-checking glucose before meals and at bedtime. He is to create a log and present to his PCP.       Billing Code(s)   Thank you for including us in their care. I spent 50 minutes in direct patient care today for this patient.   Time includes chart review, face to face with patient and collaboration with interdisciplinary care team.      KIRSTY Reese  Access via GRAEME Akhtar [phone]

## 2020-06-26 NOTE — OP NOTES
[address]  OPERATIVE REPORT    Name:  Tosin Harrison  MR#:  [id number]  :  1985  ACCOUNT #:  [de-identified]  DATE OF SERVICE:  2020    PREOPERATIVE DIAGNOSIS:  Acute appendicitis. POSTOPERATIVE DIAGNOSIS:  Acute appendicitis. PROCEDURE PERFORMED:  Laparoscopic appendectomy. SURGEON:  Marisol Pham MD    ASSISTANT:  Jm Pompa SA    ANESTHESIA:  General.    COMPLICATIONS:  none. SPECIMENS REMOVED:  appendix. IMPLANTS:  none. ESTIMATED BLOOD LOSS:  min. CLINICAL INDICATION:  The patient is a 19-year-old male who presented to the hospital complaining of abdominal pain. Further workup revealed acute appendicitis. He comes to the operating room today for laparoscopic appendectomy. Informed consent was obtained. DESCRIPTION OF PROCEDURE:  The patient was brought to the operating room and placed on the operating table in supine position. General endotracheal anesthesia was then established. He was then prepped and draped in usual sterile fashion. A 5 mm infraumbilical incision was then made. Veress needle was placed in the abdomen. Saline drop test was performed. Once we were sure we were within the abdomen, the abdomen was insufflated to 15 mmHg. Additional 5 mm and one 12 mm trocar were then placed. The small intestine was then brought off of the abdominal sidewall . Distal portion of the appendix was then identified. We then bluntly dissected the small intestine off of the very inflamed appendix. The mesentery of the appendix was then resected using the Sonicision. Initially it seemed as though we were near the base of the appendix, however, it become apparent that we still had approximately one-third to continue to dissect out. This was done bluntly and with the Sonicision until we were finally at the base of the appendix. Once we had completely freed it up to the base of the appendix, Endo-ARMEN was introduced and fired.   The appendix was then thus amputated. It was then placed into an Endobag and removed via the 12 mm trocar site. The abdomen was irrigated. No further bleeding was identified. The 12 mm trocar was removed. The fascia was closed using 0 Vicryl suture and the Endo Fascial closure device. The abdomen was desufflated. The other trocars were removed and the skin was closed using 4-0 Monocryl in subcuticular fashion. Mastisol, Steri-Strips, 2x2's, and Tegaderms were applied. The patient was awoken in the OR and taken to the PACU in stable condition.       Ronald Johnson MD      [id number]  D:  06/25/2020 13:41  T:  06/25/2020 22:25  JOB #:  [id number]

## 2020-06-26 NOTE — PROGRESS NOTES
Mr. Kulwinder Hendrix is feeling better today. Tm 101 Tc 98.8 HR: 100 BP: 138/79 Resp Rate: 18 95% sat on room air. Intake/Output Summary (Last 24 hours) at 6/26/2020 0923  Last data filed at 6/26/2020 0238  Gross per 24 hour   Intake 65 ml   Output 1560 ml   Net -1495 ml   Exam: Cor: RRR. Lungs: Bilateral breath sounds. Clear to auscultation. Abd: Soft. Non distended. Incisional tenderness. No guarding or rebound. Dressings are dry. Labs:   Recent Results (from the past 12 hour(s))   CBC WITH AUTOMATED DIFF    Collection Time: 06/26/20  2:37 AM   Result Value Ref Range    WBC 18.5 (H) 4.1 - 11.1 K/uL    RBC 4.83 4.10 - 5.70 M/uL    HGB 12.7 12.1 - 17.0 g/dL    HCT 39.5 36.6 - 50.3 %    MCV 81.8 80.0 - 99.0 FL    MCH 26.3 26.0 - 34.0 PG    MCHC 32.2 30.0 - 36.5 g/dL    RDW 12.3 11.5 - 14.5 %    PLATELET 361 938 - 343 K/uL    MPV 10.6 8.9 - 12.9 FL    NRBC 0.0 0  WBC    ABSOLUTE NRBC 0.00 0.00 - 0.01 K/uL    NEUTROPHILS 82 (H) 32 - 75 %    LYMPHOCYTES 8 (L) 12 - 49 %    MONOCYTES 9 5 - 13 %    EOSINOPHILS 0 0 - 7 %    BASOPHILS 0 0 - 1 %    IMMATURE GRANULOCYTES 1 (H) 0.0 - 0.5 %    ABS. NEUTROPHILS 15.4 (H) 1.8 - 8.0 K/UL    ABS. LYMPHOCYTES 1.4 0.8 - 3.5 K/UL    ABS. MONOCYTES 1.6 (H) 0.0 - 1.0 K/UL    ABS. EOSINOPHILS 0.0 0.0 - 0.4 K/UL    ABS. BASOPHILS 0.0 0.0 - 0.1 K/UL    ABS. IMM.  GRANS. 0.1 (H) 0.00 - 0.04 K/UL    DF AUTOMATED     METABOLIC PANEL, BASIC    Collection Time: 06/26/20  2:37 AM   Result Value Ref Range    Sodium 136 136 - 145 mmol/L    Potassium 3.8 3.5 - 5.1 mmol/L    Chloride 105 97 - 108 mmol/L    CO2 24 21 - 32 mmol/L    Anion gap 7 5 - 15 mmol/L    Glucose 188 (H) 65 - 100 mg/dL    BUN 10 6 - 20 MG/DL    Creatinine 0.82 0.70 - 1.30 MG/DL    BUN/Creatinine ratio 12 12 - 20      GFR est AA >60 >60 ml/min/1.73m2    GFR est non-AA >60 >60 ml/min/1.73m2    Calcium 8.4 (L) 8.5 - 10.1 MG/DL   MAGNESIUM    Collection Time: 06/26/20  2:37 AM Result Value Ref Range    Magnesium 1.8 1.6 - 2.4 mg/dL   PHOSPHORUS    Collection Time: 06/26/20  2:37 AM   Result Value Ref Range    Phosphorus 2.8 2.6 - 4.7 MG/DL   GLUCOSE, POC    Collection Time: 06/26/20  6:58 AM   Result Value Ref Range    Glucose (POC) 198 (H) 65 - 100 mg/dL    Performed by Stephany Ramos    Mr. To Allen is doing well following laparoscopic appendectomy. Diabetic diet as tolerated. Saline lock IVF. IV abx - Zosyn as ordered. Appreciate Diabetes Management input. Pain medication and anti-emetics as needed. OOB, Ambulate. CBC in AM 6/27/2020.

## 2020-06-26 NOTE — ROUTINE PROCESS
Bedside and Verbal shift change report given to [facility] (oncoming nurse) by Ele Paul RN (offgoing nurse). Report included the following information SBAR, Kardex, Procedure Summary, Intake/Output, MAR and Recent Results.

## 2020-06-26 NOTE — ROUTINE PROCESS
Bedside and Verbal shift change report given to Christiano Li (oncoming nurse) by Nishant Nolasco (offgoing nurse). Report included the following information SBAR, Kardex, Procedure Summary, Intake/Output, MAR and Recent Results.

## 2020-06-27 VITALS
TEMPERATURE: 98.2 F | DIASTOLIC BLOOD PRESSURE: 92 MMHG | SYSTOLIC BLOOD PRESSURE: 139 MMHG | OXYGEN SATURATION: 94 % | HEART RATE: 89 BPM | RESPIRATION RATE: 14 BRPM

## 2020-06-27 LAB
BASOPHILS # BLD: 0.1 K/UL (ref 0–0.1)
BASOPHILS NFR BLD: 1 % (ref 0–1)
DIFFERENTIAL METHOD BLD: ABNORMAL
EOSINOPHIL # BLD: 0.2 K/UL (ref 0–0.4)
EOSINOPHIL NFR BLD: 1 % (ref 0–7)
ERYTHROCYTE [DISTWIDTH] IN BLOOD BY AUTOMATED COUNT: 12.3 % (ref 11.5–14.5)
GLUCOSE BLD STRIP.AUTO-MCNC: 126 MG/DL (ref 65–100)
HCT VFR BLD AUTO: 38.7 % (ref 36.6–50.3)
HGB BLD-MCNC: 12.3 G/DL (ref 12.1–17)
IMM GRANULOCYTES # BLD AUTO: 0.1 K/UL (ref 0–0.04)
IMM GRANULOCYTES NFR BLD AUTO: 1 % (ref 0–0.5)
LYMPHOCYTES # BLD: 1.4 K/UL (ref 0.8–3.5)
LYMPHOCYTES NFR BLD: 10 % (ref 12–49)
MCH RBC QN AUTO: 26.4 PG (ref 26–34)
MCHC RBC AUTO-ENTMCNC: 31.8 G/DL (ref 30–36.5)
MCV RBC AUTO: 83 FL (ref 80–99)
MONOCYTES # BLD: 1.2 K/UL (ref 0–1)
MONOCYTES NFR BLD: 8 % (ref 5–13)
NEUTS SEG # BLD: 11.5 K/UL (ref 1.8–8)
NEUTS SEG NFR BLD: 79 % (ref 32–75)
NRBC # BLD: 0 K/UL (ref 0–0.01)
NRBC BLD-RTO: 0 PER 100 WBC
PLATELET # BLD AUTO: 334 K/UL (ref 150–400)
PMV BLD AUTO: 10.6 FL (ref 8.9–12.9)
RBC # BLD AUTO: 4.66 M/UL (ref 4.1–5.7)
SERVICE CMNT-IMP: ABNORMAL
WBC # BLD AUTO: 14.4 K/UL (ref 4.1–11.1)

## 2020-06-27 PROCEDURE — 74011250636 HC RX REV CODE- 250/636: Performed by: SURGERY

## 2020-06-27 PROCEDURE — 96376 TX/PRO/DX INJ SAME DRUG ADON: CPT

## 2020-06-27 PROCEDURE — 85025 COMPLETE CBC W/AUTO DIFF WBC: CPT

## 2020-06-27 PROCEDURE — 99218 HC RM OBSERVATION: CPT

## 2020-06-27 PROCEDURE — 74011250637 HC RX REV CODE- 250/637: Performed by: SURGERY

## 2020-06-27 PROCEDURE — 74011250636 HC RX REV CODE- 250/636: Performed by: STUDENT IN AN ORGANIZED HEALTH CARE EDUCATION/TRAINING PROGRAM

## 2020-06-27 PROCEDURE — 82962 GLUCOSE BLOOD TEST: CPT

## 2020-06-27 PROCEDURE — 36415 COLL VENOUS BLD VENIPUNCTURE: CPT

## 2020-06-27 PROCEDURE — 74011000258 HC RX REV CODE- 258: Performed by: STUDENT IN AN ORGANIZED HEALTH CARE EDUCATION/TRAINING PROGRAM

## 2020-06-27 RX ADMIN — OXYCODONE HYDROCHLORIDE AND ACETAMINOPHEN 2 TABLET: 5; 325 TABLET ORAL at 07:15

## 2020-06-27 RX ADMIN — OXYCODONE HYDROCHLORIDE AND ACETAMINOPHEN 2 TABLET: 5; 325 TABLET ORAL at 03:13

## 2020-06-27 RX ADMIN — PIPERACILLIN AND TAZOBACTAM 3.38 G: 3; .375 INJECTION, POWDER, LYOPHILIZED, FOR SOLUTION INTRAVENOUS at 01:38

## 2020-06-27 RX ADMIN — OXYCODONE HYDROCHLORIDE AND ACETAMINOPHEN 2 TABLET: 5; 325 TABLET ORAL at 11:51

## 2020-06-27 RX ADMIN — KETOROLAC TROMETHAMINE 30 MG: 30 INJECTION, SOLUTION INTRAMUSCULAR at 07:15

## 2020-06-27 NOTE — PROGRESS NOTES
Progress Note    Patient: Marisol Miller. MRN: [id number]  SSN: [id number]    YOB: 1985  Age: 30 y.o. Sex: male      Admit Date: 2020    2 Days Post-Op    Procedure:  Procedure(s):  APPENDECTOMY LAPAROSCOPIC    Subjective:     No acute surgical issues. Pt is doing well. Tolerating diet without nausea or vomiting. Pain is under control with percocet. Objective:     Visit Vitals  BP (!) 131/95 (BP 1 Location: Right arm, BP Patient Position: At rest)   Pulse 88   Temp 98.8 °F (37.1 °C)   Resp 17   SpO2 94%       Temp (24hrs), Av.8 °F (37.1 °C), Min:98.5 °F (36.9 °C), Max:99.2 °F (37.3 °C)        Physical Exam:    Gen:  NAD  Pulm:  Unlabored  Abd:  S/ND/appropriate TTP  Wound:  C/D/I    Recent Results (from the past 24 hour(s))   GLUCOSE, POC    Collection Time: 20  6:38 PM   Result Value Ref Range    Glucose (POC) 146 (H) 65 - 100 mg/dL    Performed by Candida Lucia    CBC WITH AUTOMATED DIFF    Collection Time: 20  3:21 AM   Result Value Ref Range    WBC 14.4 (H) 4.1 - 11.1 K/uL    RBC 4.66 4.10 - 5.70 M/uL    HGB 12.3 12.1 - 17.0 g/dL    HCT 38.7 36.6 - 50.3 %    MCV 83.0 80.0 - 99.0 FL    MCH 26.4 26.0 - 34.0 PG    MCHC 31.8 30.0 - 36.5 g/dL    RDW 12.3 11.5 - 14.5 %    PLATELET 854 085 - 746 K/uL    MPV 10.6 8.9 - 12.9 FL    NRBC 0.0 0  WBC    ABSOLUTE NRBC 0.00 0.00 - 0.01 K/uL    NEUTROPHILS 79 (H) 32 - 75 %    LYMPHOCYTES 10 (L) 12 - 49 %    MONOCYTES 8 5 - 13 %    EOSINOPHILS 1 0 - 7 %    BASOPHILS 1 0 - 1 %    IMMATURE GRANULOCYTES 1 (H) 0.0 - 0.5 %    ABS. NEUTROPHILS 11.5 (H) 1.8 - 8.0 K/UL    ABS. LYMPHOCYTES 1.4 0.8 - 3.5 K/UL    ABS. MONOCYTES 1.2 (H) 0.0 - 1.0 K/UL    ABS. EOSINOPHILS 0.2 0.0 - 0.4 K/UL    ABS. BASOPHILS 0.1 0.0 - 0.1 K/UL    ABS. IMM.  GRANS. 0.1 (H) 0.00 - 0.04 K/UL    DF AUTOMATED     GLUCOSE, POC    Collection Time: 20  6:05 AM   Result Value Ref Range    Glucose (POC) 126 (H) 65 - 100 mg/dL    Performed by Yamini Marquez Assessment:     Hospital Problems  Date Reviewed: 7/23/2013    None          Plan/Recommendations/Medical Decision Making:     - Diet as tolerated  - Pain control  - Plan DC home today with oral antibiotic  - Follow-up with PCP for glycemic control

## 2020-06-27 NOTE — PROGRESS NOTES
1140 patient educated with discharge instructions and Rx. Patient verbalized understanding with adequate teach back. Patient signed copy of the discharge instructions that were then placed on the hard chart.

## 2020-06-27 NOTE — DISCHARGE INSTRUCTIONS
Patient Education   Patient Education   No heavy lifting more than 10 lbs for 2 weeks   May remove outer dressings in 3 days  Leave the steri-strips in place  May shower  No tub baths or swimming. May walk and climb stairs. Appendectomy: What to Expect at Home  Your Recovery     Your doctor removed your appendix either by making many small cuts, called incisions, in your belly (laparoscopic surgery) or through open surgery. In open surgery, the doctor makes one large incision. The incisions leave scars that usually fade over time. After your surgery, it is normal to feel weak and tired for several days after you return home. Your belly may be swollen and may be painful. If you had laparoscopic surgery, you may have pain in your shoulder for about 24 hours. You may also feel sick to your stomach and have diarrhea, constipation, gas, or a headache. This usually goes away in a few days. Your recovery time depends on the type of surgery you had. If you had laparoscopic surgery, you will probably be able to return to work or a normal routine 1 to 3 weeks after surgery. If you had an open surgery, it may take 2 to 4 weeks. If your appendix ruptured, you may have a drain in your incision. Your body will work fine without an appendix. You will not have to make any changes in your diet or lifestyle. This care sheet gives you a general idea about how long it will take for you to recover. But each person recovers at a different pace. Follow the steps below to get better as quickly as possible. How can you care for yourself at home? Activity  · Rest when you feel tired. Getting enough sleep will help you recover. · Try to walk each day. Start by walking a little more than you did the day before. Bit by bit, increase the amount you walk. Walking boosts blood flow and helps prevent pneumonia and constipation. · For about 2 weeks, avoid lifting anything that would make you strain.  This may include a child, heavy grocery bags and milk containers, a heavy briefcase or backpack, cat litter or dog food bags, or a vacuum . · Avoid strenuous activities, such as bicycle riding, jogging, weight lifting, or aerobic exercise, until your doctor says it is okay. · You may be able to take showers (unless you have a drain near your incision) 24 to 48 hours after surgery. Pat the incision dry. Do not take a bath for the first 2 weeks, or until your doctor tells you it is okay. If you have a drain near your incision, follow your doctor's instructions. · You may drive when you are no longer taking pain medicine and can quickly move your foot from the gas pedal to the brake. You must also be able to sit comfortably for a long period of time, even if you do not plan on going far. You might get caught in traffic. · You will probably be able to go back to work in 1 to 3 weeks. If you had an open surgery, it may take 3 to 4 weeks. · Your doctor will tell you when you can have sex again. Diet  · You can eat your normal diet. If your stomach is upset, try bland, low-fat foods like plain rice, broiled chicken, toast, and yogurt. · Drink plenty of fluids (unless your doctor tells you not to). · You may notice that your bowel movements are not regular right after your surgery. This is common. Try to avoid constipation and straining with bowel movements. You may want to take a fiber supplement every day. If you have not had a bowel movement after a couple of days, ask your doctor about taking a mild laxative. Medicines  · Your doctor will tell you if and when you can restart your medicines. He or she will also give you instructions about taking any new medicines. · If you take aspirin or some other blood thinner, ask your doctor if and when to start taking it again. Make sure that you understand exactly what your doctor wants you to do. · If your appendix ruptured, you will need to take antibiotics. Take them as directed.  Do not stop taking them just because you feel better. You need to take the full course of antibiotics. · Be safe with medicines. Take pain medicines exactly as directed. ? If the doctor gave you a prescription medicine for pain, take it as prescribed. ? If you are not taking a prescription pain medicine, take an over-the-counter medicine such as acetaminophen (Tylenol), ibuprofen (Advil, Motrin), or naproxen (Aleve). Read and follow all instructions on the label. ? Do not take two or more pain medicines at the same time unless the doctor told you to. Many pain medicines have acetaminophen, which is Tylenol. Too much Tylenol can be harmful. · If you think your pain medicine is making you sick to your stomach:  ? Take your medicine after meals (unless your doctor has told you not to). ? Ask your doctor for a different pain medicine. Incision care  · If you had an open surgery, you may have staples in your incision. The doctor will take these out in 7 to 10 days. · If you have strips of tape on the incision, leave the tape on for a week or until it falls off. · You may wash the area with warm, soapy water 24 to 48 hours after your surgery, unless your doctor tells you not to. Pat the area dry. · Keep the area clean and dry. You may cover it with a gauze bandage if it weeps or rubs against clothing. Change the bandage every day. · If your appendix ruptured, you may have an incision with packing in it. Change the packing as often as your doctor tells you to. ? Packing changes may hurt at first. Taking pain medicine about half an hour before you change the dressing can help. ? If your dressing sticks to your wound, try soaking it with warm water for about 10 minutes before you remove it. You can do this in the shower or by placing a wet washcloth over the dressing. ? Remove the old packing and flush the incision with water. Gently pat the top area dry. ?  The size of the incision determines how much gauze you need to put inside. Fold the gauze over once, but do not wad it up so that it hurts. Put it in the wound carefully. You want to keep the sides of the wound from touching. A cotton swab may help you push the gauze in as needed. ? Put a gauze pad over the wound, and tape it down. ? You may notice greenish gray fluid seeping from your wound as you start to heal. This is normal. It is a sign that your wound is healing. Follow-up care is a key part of your treatment and safety. Be sure to make and go to all appointments, and call your doctor if you are having problems. It's also a good idea to know your test results and keep a list of the medicines you take. When should you call for help? [id number] anytime you think you may need emergency care. For example, call if:  · You passed out (lost consciousness). · You are short of breath. .  Call your doctor now or seek immediate medical care if:  · You are sick to your stomach and cannot drink fluids. · You cannot pass stools or gas. · You have pain that does not get better when you take your pain medicine. · You have signs of infection, such as:  ? Increased pain, swelling, warmth, or redness. ? Red streaks leading from the wound. ? Pus draining from the wound. ? A fever. · You have loose stitches, or your incision comes open. · Bright red blood has soaked through the bandage over your incision. · You have signs of a blood clot in your leg (called a deep vein thrombosis), such as:  ? Pain in your calf, back of knee, thigh, or groin. ? Redness and swelling in your leg or groin. Watch closely for any changes in your health, and be sure to contact your doctor if you have any problems. Where can you learn more? Go to http://www.gray.com/  Enter X801 in the search box to learn more about \"Appendectomy: What to Expect at Home. \"  Current as of: August 12, 2019               Content Version: 12.5  © 1683-0727 Healthwise, [facility].    Care instructions adapted under license by [employer] (which disclaims liability or warranty for this information). If you have questions about a medical condition or this instruction, always ask your healthcare professional. [address] any warranty or liability for your use of this information. Learning About Appendectomy  What is an appendectomy? An appendectomy is surgery to take out the appendix. This organ is a small sac that is shaped like a finger. It's attached to your large intestine. Appendicitis happens when the appendix becomes infected and inflamed. An appendectomy is the main treatment for it. If surgery is delayed, the inflamed appendix may burst. A burst appendix can cause serious health problems. If your appendix has burst, you may need an emergency surgery to remove the burst appendix. How is this surgery done? Before surgery, you will get medicine to make you sleep. Appendectomy is usually done as a laparoscopic surgery. That means it is done with only small cuts. These cuts are called incisions. The doctor puts a lighted tube, or scope, and other surgical tools through the cuts in your belly. The doctor is able to see your organs with the scope. The doctor removes the appendix. The cuts heal quickly, and the scars usually fade over time. In some cases, the surgery is done through a single larger cut in the belly. This is called open surgery. What can you expect after surgery? Most people leave the hospital 1 to 3 days after surgery. Some even go home the same day. After you go home, it is normal to feel weak and tired for several days. Your belly may be swollen and may be painful. If you had laparoscopic surgery, you may have shoulder pain for about 24 hours. You may also feel sick to your stomach and have diarrhea, constipation, gas, or a headache. This usually goes away in a few days.   Your recovery time depends on the type of surgery you had. If you had laparoscopic surgery, you will probably be able to go back to work or your normal routine 1 to 3 weeks after surgery. If you had an open surgery, it may take 2 to 4 weeks. If your appendix burst, you may have a drain in your incision. Your body will work fine without an appendix. You will not have to make any changes in your diet or daily life. After surgery, be sure to follow your doctor's advice about problems to watch for. These may include fever, worse belly pain, or problems with your incision. Follow-up care is a key part of your treatment and safety. Be sure to make and go to all appointments, and call your doctor if you are having problems. It's also a good idea to know your test results and keep a list of the medicines you take. Where can you learn more? Go to http://serjio-néstor.info/  Enter J277 in the search box to learn more about \"Learning About Appendectomy. \"  Current as of: August 12, 2019               Content Version: 12.5  © 9660-3280 Healthwise[employer]. Care instructions adapted under license by [employer] (which disclaims liability or warranty for this information). If you have questions about a medical condition or this instruction, always ask your healthcare professional. [address] any warranty or liability for your use of this information.

## 2020-06-27 NOTE — ANESTHESIA POSTPROCEDURE EVALUATION
Procedure(s):  APPENDECTOMY LAPAROSCOPIC. general    <BSHSIANPOST>    INITIAL Post-op Vital signs:   Vitals Value Taken Time   /58 6/25/2020  3:00 PM   Temp 37.2 °C (99 °F) 6/25/2020  2:50 PM   Pulse 91 6/25/2020  3:05 PM   Resp 17 6/25/2020  3:05 PM   SpO2 99 % 6/25/2020  3:05 PM   Vitals shown include unvalidated device data.

## 2020-06-29 ENCOUNTER — TELEPHONE (OUTPATIENT)
Dept: SURGERY | Age: 35
End: 2020-06-29

## 2020-06-29 NOTE — TELEPHONE ENCOUNTER
Returned call to patient. Two patient identifiers used. Patient is 4 days s/p Lap Appendectomy. Spoke to patient and  spouse who stated patient had a temp of 101 on the day of d/c and had a temp of 100 Saturday. Mrs. Abdul took patients temp before calling the office and stated it was 99. Denies n/v, SOB, diminished appetite, erythema, swelling, drainage. LBM 06/29/20230, normal.  Mrs. Abdul wanted to know if it was ok for the patient to get up and move around. Advised spouse to encourage patient to get out of bed and ambulate as tolerated. No driving while taking rx pain medications. Patient to continue mottrin, fluids, and monitor temp and to call the office if temp gets to 101.5 and above. Spouse expressed understanding of the above and will continue to monitor patient.

## 2020-06-29 NOTE — TELEPHONE ENCOUNTER
Patient's spouse called stating she would like to make sure her 's temperatures are normal.  She stated it's 99 today but was 100/101 yesterday.

## 2020-07-01 ENCOUNTER — TELEPHONE (OUTPATIENT)
Dept: SURGERY | Age: 35
End: 2020-07-01

## 2020-07-01 ENCOUNTER — OFFICE VISIT (OUTPATIENT)
Dept: SURGERY | Age: 35
End: 2020-07-01

## 2020-07-01 VITALS
DIASTOLIC BLOOD PRESSURE: 79 MMHG | WEIGHT: 239 LBS | RESPIRATION RATE: 20 BRPM | HEART RATE: 79 BPM | OXYGEN SATURATION: 98 % | HEIGHT: 74 IN | SYSTOLIC BLOOD PRESSURE: 125 MMHG | BODY MASS INDEX: 30.67 KG/M2

## 2020-07-01 DIAGNOSIS — G89.18 POSTOPERATIVE PAIN: Primary | ICD-10-CM

## 2020-07-01 DIAGNOSIS — Z09 POSTOPERATIVE EXAMINATION: ICD-10-CM

## 2020-07-01 RX ORDER — OXYCODONE AND ACETAMINOPHEN 5; 325 MG/1; MG/1
1 TABLET ORAL
Qty: 20 TAB | Refills: 0 | Status: ON HOLD | OUTPATIENT
Start: 2020-07-01 | End: 2020-07-09

## 2020-07-01 NOTE — PROGRESS NOTES
1. Have you been to the ER, urgent care clinic since your last visit? Hospitalized since your last visit? No    2. Have you seen or consulted any other health care providers outside of the [address] [place] [address] since your last visit? Include any pap smears or colon screening.  No

## 2020-07-01 NOTE — TELEPHONE ENCOUNTER
Two patient identifiers used. Patients spouse called stating she called the on call last night because patient was having \"a lot of pain\". Spoke to patient who stated he has taken 1,000mg of Tylenol which helps somewhat, but when he stands up or moves it hurts more. Denies temp, SOB, n/v. Complains of \"some swelling to one of the incisions, but no erythema.   Patient placed on schedule for 1pm for abdominal pain eval.

## 2020-07-01 NOTE — PROGRESS NOTES
Subjective: Ruby Mcrae is a 30 y.o. male presents for postop care 6 days  Status post Laparoscopic appendectomy by Dr. Danyel Soto. Appetite is poor. Eating a crackers. He complains of increased pain. He ran out of pain medication after 3 days and tried to control his pain with tylenol without relief. He is uncomfortable and arrived in a wheelchair. He is having a hard time walking. Denies fevers or chills. He is taking Augmentin. Patient has an advanced directive:  Not on file. Mr. Kulwinder Hendrix has a reminder for a \"due or due soon\" health maintenance. I have asked that he contact his primary care provider for follow-up on this health maintenance. Objective:     Visit Vitals  /79   Pulse 79   Resp 20   Ht 6' 2\" (1.88 m)   Wt 239 lb (108.4 kg)   SpO2 98%   BMI 30.69 kg/m²       General:  alert, cooperative, no distress   Abdomen: soft, bowel sounds active, tender left lower incision. Incision:   healing well, no drainage, no dehiscence, incision well approximated     Assessment:     Doing well postoperatively. Post-op pain. Plan:     1. Percocet 5/325 mg prescribed. Take 1 tab q 6 hours as needed for pain. May take 600 mg of Ibuprofen every 8 hours as needed for pain. 2. Walk as tolerated. 3. May use ice to the area. 4. Follow up next week with Dr. Danyel Soto. 5. Advised if pain worsened or he developed a fever to call the office or go to the ER. Pt verbalized understanding and questions were answered to the best of my knowledge and ability.

## 2020-07-06 ENCOUNTER — HOSPITAL ENCOUNTER (INPATIENT)
Age: 35
LOS: 4 days | Discharge: HOME HEALTH | DRG: 721 | End: 2020-07-11
Attending: EMERGENCY MEDICINE | Admitting: SURGERY
Payer: MEDICAID

## 2020-07-06 ENCOUNTER — APPOINTMENT (OUTPATIENT)
Dept: CT IMAGING | Age: 35
DRG: 721 | End: 2020-07-06
Attending: PHYSICIAN ASSISTANT
Payer: MEDICAID

## 2020-07-06 DIAGNOSIS — D72.829 LEUKOCYTOSIS, UNSPECIFIED TYPE: ICD-10-CM

## 2020-07-06 DIAGNOSIS — T81.49XA POST-OPERATIVE WOUND ABSCESS: ICD-10-CM

## 2020-07-06 DIAGNOSIS — R73.9 HYPERGLYCEMIA: ICD-10-CM

## 2020-07-06 DIAGNOSIS — L76.82 POSTOPERATIVE SURGICAL COMPLICATION INVOLVING SKIN ASSOCIATED WITH NON-DERMATOLOGIC PROCEDURE, UNSPECIFIED COMPLICATION: Primary | ICD-10-CM

## 2020-07-06 DIAGNOSIS — G89.18 POSTOPERATIVE PAIN: ICD-10-CM

## 2020-07-06 PROBLEM — Z90.49 S/P LAPAROSCOPIC APPENDECTOMY: Status: ACTIVE | Noted: 2020-07-06

## 2020-07-06 PROBLEM — K37 APPENDICITIS: Status: RESOLVED | Noted: 2020-06-25 | Resolved: 2020-07-06

## 2020-07-06 LAB
ALBUMIN SERPL-MCNC: 2.7 G/DL (ref 3.5–5)
ALBUMIN/GLOB SERPL: 0.4 {RATIO} (ref 1.1–2.2)
ALP SERPL-CCNC: 118 U/L (ref 45–117)
ALT SERPL-CCNC: 21 U/L (ref 12–78)
ANION GAP SERPL CALC-SCNC: 8 MMOL/L (ref 5–15)
AST SERPL-CCNC: 15 U/L (ref 15–37)
BASOPHILS # BLD: 0.3 K/UL (ref 0–0.1)
BASOPHILS NFR BLD: 1 % (ref 0–1)
BILIRUB SERPL-MCNC: 0.5 MG/DL (ref 0.2–1)
BUN SERPL-MCNC: 13 MG/DL (ref 6–20)
BUN/CREAT SERPL: 14 (ref 12–20)
CALCIUM SERPL-MCNC: 9.7 MG/DL (ref 8.5–10.1)
CHLORIDE SERPL-SCNC: 101 MMOL/L (ref 97–108)
CO2 SERPL-SCNC: 25 MMOL/L (ref 21–32)
CREAT SERPL-MCNC: 0.93 MG/DL (ref 0.7–1.3)
DIFFERENTIAL METHOD BLD: ABNORMAL
EOSINOPHIL # BLD: 0.3 K/UL (ref 0–0.4)
EOSINOPHIL NFR BLD: 1 % (ref 0–7)
ERYTHROCYTE [DISTWIDTH] IN BLOOD BY AUTOMATED COUNT: 12.4 % (ref 11.5–14.5)
GLOBULIN SER CALC-MCNC: 6.6 G/DL (ref 2–4)
GLUCOSE SERPL-MCNC: 168 MG/DL (ref 65–100)
HCT VFR BLD AUTO: 41.6 % (ref 36.6–50.3)
HGB BLD-MCNC: 13.2 G/DL (ref 12.1–17)
IMM GRANULOCYTES # BLD AUTO: 0 K/UL
IMM GRANULOCYTES NFR BLD AUTO: 0 %
LYMPHOCYTES # BLD: 1.2 K/UL (ref 0.8–3.5)
LYMPHOCYTES NFR BLD: 4 % (ref 12–49)
MCH RBC QN AUTO: 26.5 PG (ref 26–34)
MCHC RBC AUTO-ENTMCNC: 31.7 G/DL (ref 30–36.5)
MCV RBC AUTO: 83.4 FL (ref 80–99)
MONOCYTES # BLD: 1.9 K/UL (ref 0–1)
MONOCYTES NFR BLD: 6 % (ref 5–13)
NEUTS BAND NFR BLD MANUAL: 4 % (ref 0–6)
NEUTS SEG # BLD: 27.5 K/UL (ref 1.8–8)
NEUTS SEG NFR BLD: 84 % (ref 32–75)
NRBC # BLD: 0 K/UL (ref 0–0.01)
NRBC BLD-RTO: 0 PER 100 WBC
PLATELET # BLD AUTO: 594 K/UL (ref 150–400)
PLATELET COMMENTS,PCOM: ABNORMAL
PMV BLD AUTO: 10.4 FL (ref 8.9–12.9)
POTASSIUM SERPL-SCNC: 3.8 MMOL/L (ref 3.5–5.1)
PROT SERPL-MCNC: 9.3 G/DL (ref 6.4–8.2)
RBC # BLD AUTO: 4.99 M/UL (ref 4.1–5.7)
RBC MORPH BLD: ABNORMAL
RBC MORPH BLD: ABNORMAL
SODIUM SERPL-SCNC: 134 MMOL/L (ref 136–145)
WBC # BLD AUTO: 31.2 K/UL (ref 4.1–11.1)

## 2020-07-06 PROCEDURE — 87040 BLOOD CULTURE FOR BACTERIA: CPT

## 2020-07-06 PROCEDURE — 85025 COMPLETE CBC W/AUTO DIFF WBC: CPT

## 2020-07-06 PROCEDURE — 87205 SMEAR GRAM STAIN: CPT

## 2020-07-06 PROCEDURE — 87077 CULTURE AEROBIC IDENTIFY: CPT

## 2020-07-06 PROCEDURE — 80053 COMPREHEN METABOLIC PANEL: CPT

## 2020-07-06 PROCEDURE — 87185 SC STD ENZYME DETCJ PER NZM: CPT

## 2020-07-06 PROCEDURE — 74011000258 HC RX REV CODE- 258: Performed by: RADIOLOGY

## 2020-07-06 PROCEDURE — 74011636320 HC RX REV CODE- 636/320: Performed by: RADIOLOGY

## 2020-07-06 PROCEDURE — 99283 EMERGENCY DEPT VISIT LOW MDM: CPT

## 2020-07-06 PROCEDURE — 96361 HYDRATE IV INFUSION ADD-ON: CPT

## 2020-07-06 PROCEDURE — 87186 SC STD MICRODIL/AGAR DIL: CPT

## 2020-07-06 PROCEDURE — 74011250636 HC RX REV CODE- 250/636: Performed by: PHYSICIAN ASSISTANT

## 2020-07-06 PROCEDURE — 36415 COLL VENOUS BLD VENIPUNCTURE: CPT

## 2020-07-06 PROCEDURE — 96360 HYDRATION IV INFUSION INIT: CPT

## 2020-07-06 RX ORDER — SODIUM CHLORIDE 0.9 % (FLUSH) 0.9 %
10 SYRINGE (ML) INJECTION
Status: COMPLETED | OUTPATIENT
Start: 2020-07-06 | End: 2020-07-06

## 2020-07-06 RX ADMIN — IOPAMIDOL 100 ML: 755 INJECTION, SOLUTION INTRAVENOUS at 22:23

## 2020-07-06 RX ADMIN — Medication 10 ML: at 22:23

## 2020-07-06 RX ADMIN — SODIUM CHLORIDE 100 ML: 900 INJECTION, SOLUTION INTRAVENOUS at 22:23

## 2020-07-06 RX ADMIN — SODIUM CHLORIDE 1000 ML: 900 INJECTION, SOLUTION INTRAVENOUS at 22:22

## 2020-07-06 NOTE — PROGRESS NOTES
Back chart to record percocet 2 tabs given forgot to scan one. Recorded and updated in the [facility].

## 2020-07-07 ENCOUNTER — APPOINTMENT (OUTPATIENT)
Dept: CT IMAGING | Age: 35
DRG: 721 | End: 2020-07-07
Attending: PHYSICIAN ASSISTANT
Payer: MEDICAID

## 2020-07-07 ENCOUNTER — ANESTHESIA EVENT (OUTPATIENT)
Dept: SURGERY | Age: 35
DRG: 721 | End: 2020-07-07
Payer: MEDICAID

## 2020-07-07 ENCOUNTER — ANESTHESIA (OUTPATIENT)
Dept: SURGERY | Age: 35
DRG: 721 | End: 2020-07-07
Payer: MEDICAID

## 2020-07-07 PROBLEM — L02.91 ABSCESS: Status: ACTIVE | Noted: 2020-07-06

## 2020-07-07 LAB
ANION GAP SERPL CALC-SCNC: 8 MMOL/L (ref 5–15)
BASOPHILS # BLD: 0.3 K/UL (ref 0–0.1)
BASOPHILS NFR BLD: 1 % (ref 0–1)
BUN SERPL-MCNC: 12 MG/DL (ref 6–20)
BUN/CREAT SERPL: 18 (ref 12–20)
CALCIUM SERPL-MCNC: 8.8 MG/DL (ref 8.5–10.1)
CHLORIDE SERPL-SCNC: 101 MMOL/L (ref 97–108)
CO2 SERPL-SCNC: 22 MMOL/L (ref 21–32)
CREAT SERPL-MCNC: 0.68 MG/DL (ref 0.7–1.3)
DIFFERENTIAL METHOD BLD: ABNORMAL
EOSINOPHIL # BLD: 0 K/UL (ref 0–0.4)
EOSINOPHIL NFR BLD: 0 % (ref 0–7)
ERYTHROCYTE [DISTWIDTH] IN BLOOD BY AUTOMATED COUNT: 12.5 % (ref 11.5–14.5)
EST. AVERAGE GLUCOSE BLD GHB EST-MCNC: 255 MG/DL
GLUCOSE BLD STRIP.AUTO-MCNC: 143 MG/DL (ref 65–100)
GLUCOSE BLD STRIP.AUTO-MCNC: 144 MG/DL (ref 65–100)
GLUCOSE BLD STRIP.AUTO-MCNC: 148 MG/DL (ref 65–100)
GLUCOSE BLD STRIP.AUTO-MCNC: 160 MG/DL (ref 65–100)
GLUCOSE SERPL-MCNC: 152 MG/DL (ref 65–100)
HBA1C MFR BLD: 10.5 % (ref 4–5.6)
HCT VFR BLD AUTO: 38.6 % (ref 36.6–50.3)
HGB BLD-MCNC: 12.3 G/DL (ref 12.1–17)
IMM GRANULOCYTES # BLD AUTO: 0.3 K/UL (ref 0–0.04)
IMM GRANULOCYTES NFR BLD AUTO: 1 % (ref 0–0.5)
LACTATE SERPL-SCNC: 1.2 MMOL/L (ref 0.4–2)
LYMPHOCYTES # BLD: 1.9 K/UL (ref 0.8–3.5)
LYMPHOCYTES NFR BLD: 6 % (ref 12–49)
MAGNESIUM SERPL-MCNC: 2.2 MG/DL (ref 1.6–2.4)
MCH RBC QN AUTO: 26.3 PG (ref 26–34)
MCHC RBC AUTO-ENTMCNC: 31.9 G/DL (ref 30–36.5)
MCV RBC AUTO: 82.7 FL (ref 80–99)
MONOCYTES # BLD: 1.6 K/UL (ref 0–1)
MONOCYTES NFR BLD: 5 % (ref 5–13)
NEUTS SEG # BLD: 28.2 K/UL (ref 1.8–8)
NEUTS SEG NFR BLD: 87 % (ref 32–75)
NRBC # BLD: 0 K/UL (ref 0–0.01)
NRBC BLD-RTO: 0 PER 100 WBC
PHOSPHATE SERPL-MCNC: 3.2 MG/DL (ref 2.6–4.7)
PLATELET # BLD AUTO: 599 K/UL (ref 150–400)
PLATELET COMMENTS,PCOM: ABNORMAL
PMV BLD AUTO: 10.5 FL (ref 8.9–12.9)
POTASSIUM SERPL-SCNC: 3.8 MMOL/L (ref 3.5–5.1)
RBC # BLD AUTO: 4.67 M/UL (ref 4.1–5.7)
RBC MORPH BLD: ABNORMAL
SERVICE CMNT-IMP: ABNORMAL
SODIUM SERPL-SCNC: 131 MMOL/L (ref 136–145)
WBC # BLD AUTO: 32.3 K/UL (ref 4.1–11.1)

## 2020-07-07 PROCEDURE — 77030011640 HC PAD GRND REM COVD -A: Performed by: SURGERY

## 2020-07-07 PROCEDURE — 74011636637 HC RX REV CODE- 636/637: Performed by: SURGERY

## 2020-07-07 PROCEDURE — 87077 CULTURE AEROBIC IDENTIFY: CPT

## 2020-07-07 PROCEDURE — 74011000250 HC RX REV CODE- 250: Performed by: NURSE ANESTHETIST, CERTIFIED REGISTERED

## 2020-07-07 PROCEDURE — 87075 CULTR BACTERIA EXCEPT BLOOD: CPT

## 2020-07-07 PROCEDURE — 36415 COLL VENOUS BLD VENIPUNCTURE: CPT

## 2020-07-07 PROCEDURE — 76210000006 HC OR PH I REC 0.5 TO 1 HR: Performed by: SURGERY

## 2020-07-07 PROCEDURE — 77030008684 HC TU ET CUF COVD -B: Performed by: ANESTHESIOLOGY

## 2020-07-07 PROCEDURE — 77030026438 HC STYL ET INTUB CARD -A: Performed by: ANESTHESIOLOGY

## 2020-07-07 PROCEDURE — 74011000250 HC RX REV CODE- 250: Performed by: SURGERY

## 2020-07-07 PROCEDURE — 74011000258 HC RX REV CODE- 258: Performed by: PHYSICIAN ASSISTANT

## 2020-07-07 PROCEDURE — 74011250636 HC RX REV CODE- 250/636: Performed by: ANESTHESIOLOGY

## 2020-07-07 PROCEDURE — 83735 ASSAY OF MAGNESIUM: CPT

## 2020-07-07 PROCEDURE — 74177 CT ABD & PELVIS W/CONTRAST: CPT

## 2020-07-07 PROCEDURE — 76060000033 HC ANESTHESIA 1 TO 1.5 HR: Performed by: SURGERY

## 2020-07-07 PROCEDURE — 83605 ASSAY OF LACTIC ACID: CPT

## 2020-07-07 PROCEDURE — 80048 BASIC METABOLIC PNL TOTAL CA: CPT

## 2020-07-07 PROCEDURE — 87205 SMEAR GRAM STAIN: CPT

## 2020-07-07 PROCEDURE — 74011250636 HC RX REV CODE- 250/636: Performed by: SURGERY

## 2020-07-07 PROCEDURE — 76010000149 HC OR TIME 1 TO 1.5 HR: Performed by: SURGERY

## 2020-07-07 PROCEDURE — 0JB80ZZ EXCISION OF ABDOMEN SUBCUTANEOUS TISSUE AND FASCIA, OPEN APPROACH: ICD-10-PCS | Performed by: SURGERY

## 2020-07-07 PROCEDURE — 84100 ASSAY OF PHOSPHORUS: CPT

## 2020-07-07 PROCEDURE — 77030018836 HC SOL IRR NACL ICUM -A: Performed by: SURGERY

## 2020-07-07 PROCEDURE — 83036 HEMOGLOBIN GLYCOSYLATED A1C: CPT

## 2020-07-07 PROCEDURE — 74011636320 HC RX REV CODE- 636/320: Performed by: EMERGENCY MEDICINE

## 2020-07-07 PROCEDURE — 74011250636 HC RX REV CODE- 250/636: Performed by: NURSE ANESTHETIST, CERTIFIED REGISTERED

## 2020-07-07 PROCEDURE — 65270000032 HC RM SEMIPRIVATE

## 2020-07-07 PROCEDURE — 74011250637 HC RX REV CODE- 250/637: Performed by: SURGERY

## 2020-07-07 PROCEDURE — 74011000258 HC RX REV CODE- 258: Performed by: SURGERY

## 2020-07-07 PROCEDURE — 87186 SC STD MICRODIL/AGAR DIL: CPT

## 2020-07-07 PROCEDURE — 74011250636 HC RX REV CODE- 250/636: Performed by: PHYSICIAN ASSISTANT

## 2020-07-07 PROCEDURE — 82962 GLUCOSE BLOOD TEST: CPT

## 2020-07-07 PROCEDURE — 85025 COMPLETE CBC W/AUTO DIFF WBC: CPT

## 2020-07-07 RX ORDER — FENTANYL CITRATE 50 UG/ML
INJECTION, SOLUTION INTRAMUSCULAR; INTRAVENOUS AS NEEDED
Status: DISCONTINUED | OUTPATIENT
Start: 2020-07-07 | End: 2020-07-07 | Stop reason: HOSPADM

## 2020-07-07 RX ORDER — SODIUM CHLORIDE 0.9 % (FLUSH) 0.9 %
5-40 SYRINGE (ML) INJECTION EVERY 8 HOURS
Status: DISCONTINUED | OUTPATIENT
Start: 2020-07-07 | End: 2020-07-11 | Stop reason: HOSPADM

## 2020-07-07 RX ORDER — MIDAZOLAM HYDROCHLORIDE 1 MG/ML
INJECTION, SOLUTION INTRAMUSCULAR; INTRAVENOUS AS NEEDED
Status: DISCONTINUED | OUTPATIENT
Start: 2020-07-07 | End: 2020-07-07 | Stop reason: HOSPADM

## 2020-07-07 RX ORDER — FENTANYL CITRATE 50 UG/ML
50 INJECTION, SOLUTION INTRAMUSCULAR; INTRAVENOUS AS NEEDED
Status: DISCONTINUED | OUTPATIENT
Start: 2020-07-07 | End: 2020-07-07 | Stop reason: HOSPADM

## 2020-07-07 RX ORDER — SODIUM CHLORIDE 0.9 % (FLUSH) 0.9 %
5-40 SYRINGE (ML) INJECTION EVERY 8 HOURS
Status: DISCONTINUED | OUTPATIENT
Start: 2020-07-07 | End: 2020-07-07 | Stop reason: HOSPADM

## 2020-07-07 RX ORDER — MORPHINE SULFATE 2 MG/ML
2 INJECTION, SOLUTION INTRAMUSCULAR; INTRAVENOUS
Status: DISCONTINUED | OUTPATIENT
Start: 2020-07-07 | End: 2020-07-11 | Stop reason: HOSPADM

## 2020-07-07 RX ORDER — ROCURONIUM BROMIDE 10 MG/ML
INJECTION, SOLUTION INTRAVENOUS AS NEEDED
Status: DISCONTINUED | OUTPATIENT
Start: 2020-07-07 | End: 2020-07-07 | Stop reason: HOSPADM

## 2020-07-07 RX ORDER — DEXAMETHASONE SODIUM PHOSPHATE 4 MG/ML
INJECTION, SOLUTION INTRA-ARTICULAR; INTRALESIONAL; INTRAMUSCULAR; INTRAVENOUS; SOFT TISSUE AS NEEDED
Status: DISCONTINUED | OUTPATIENT
Start: 2020-07-07 | End: 2020-07-07 | Stop reason: HOSPADM

## 2020-07-07 RX ORDER — MIDAZOLAM HYDROCHLORIDE 1 MG/ML
1 INJECTION, SOLUTION INTRAMUSCULAR; INTRAVENOUS AS NEEDED
Status: DISCONTINUED | OUTPATIENT
Start: 2020-07-07 | End: 2020-07-07 | Stop reason: HOSPADM

## 2020-07-07 RX ORDER — VANCOMYCIN 2 GRAM/500 ML IN 0.9 % SODIUM CHLORIDE INTRAVENOUS
2000 ONCE
Status: COMPLETED | OUTPATIENT
Start: 2020-07-07 | End: 2020-07-07

## 2020-07-07 RX ORDER — HYDROMORPHONE HYDROCHLORIDE 2 MG/ML
INJECTION, SOLUTION INTRAMUSCULAR; INTRAVENOUS; SUBCUTANEOUS AS NEEDED
Status: DISCONTINUED | OUTPATIENT
Start: 2020-07-07 | End: 2020-07-07 | Stop reason: HOSPADM

## 2020-07-07 RX ORDER — BUPIVACAINE HYDROCHLORIDE 2.5 MG/ML
INJECTION, SOLUTION EPIDURAL; INFILTRATION; INTRACAUDAL AS NEEDED
Status: DISCONTINUED | OUTPATIENT
Start: 2020-07-07 | End: 2020-07-07 | Stop reason: HOSPADM

## 2020-07-07 RX ORDER — MAGNESIUM SULFATE 100 %
4 CRYSTALS MISCELLANEOUS AS NEEDED
Status: DISCONTINUED | OUTPATIENT
Start: 2020-07-07 | End: 2020-07-11 | Stop reason: HOSPADM

## 2020-07-07 RX ORDER — MORPHINE SULFATE 10 MG/ML
2 INJECTION, SOLUTION INTRAMUSCULAR; INTRAVENOUS
Status: DISCONTINUED | OUTPATIENT
Start: 2020-07-07 | End: 2020-07-07 | Stop reason: HOSPADM

## 2020-07-07 RX ORDER — ACETAMINOPHEN 500 MG
1000 TABLET ORAL
Status: DISCONTINUED | OUTPATIENT
Start: 2020-07-07 | End: 2020-07-11 | Stop reason: HOSPADM

## 2020-07-07 RX ORDER — SODIUM CHLORIDE, SODIUM LACTATE, POTASSIUM CHLORIDE, CALCIUM CHLORIDE 600; 310; 30; 20 MG/100ML; MG/100ML; MG/100ML; MG/100ML
75 INJECTION, SOLUTION INTRAVENOUS CONTINUOUS
Status: DISCONTINUED | OUTPATIENT
Start: 2020-07-07 | End: 2020-07-07 | Stop reason: HOSPADM

## 2020-07-07 RX ORDER — FENTANYL CITRATE 50 UG/ML
25 INJECTION, SOLUTION INTRAMUSCULAR; INTRAVENOUS
Status: DISCONTINUED | OUTPATIENT
Start: 2020-07-07 | End: 2020-07-07 | Stop reason: HOSPADM

## 2020-07-07 RX ORDER — VANCOMYCIN HYDROCHLORIDE
1250 EVERY 8 HOURS
Status: DISCONTINUED | OUTPATIENT
Start: 2020-07-07 | End: 2020-07-08

## 2020-07-07 RX ORDER — SODIUM CHLORIDE 0.9 % (FLUSH) 0.9 %
5-40 SYRINGE (ML) INJECTION AS NEEDED
Status: DISCONTINUED | OUTPATIENT
Start: 2020-07-07 | End: 2020-07-11 | Stop reason: HOSPADM

## 2020-07-07 RX ORDER — SODIUM CHLORIDE 9 MG/ML
75 INJECTION, SOLUTION INTRAVENOUS CONTINUOUS
Status: DISCONTINUED | OUTPATIENT
Start: 2020-07-07 | End: 2020-07-08

## 2020-07-07 RX ORDER — ONDANSETRON 2 MG/ML
4 INJECTION INTRAMUSCULAR; INTRAVENOUS AS NEEDED
Status: DISCONTINUED | OUTPATIENT
Start: 2020-07-07 | End: 2020-07-07 | Stop reason: HOSPADM

## 2020-07-07 RX ORDER — SODIUM CHLORIDE 9 MG/ML
50 INJECTION, SOLUTION INTRAVENOUS CONTINUOUS
Status: DISCONTINUED | OUTPATIENT
Start: 2020-07-07 | End: 2020-07-07 | Stop reason: HOSPADM

## 2020-07-07 RX ORDER — DIPHENHYDRAMINE HCL 25 MG
25 CAPSULE ORAL
Status: ACTIVE | OUTPATIENT
Start: 2020-07-07 | End: 2020-07-08

## 2020-07-07 RX ORDER — DEXTROSE MONOHYDRATE 100 MG/ML
0-250 INJECTION, SOLUTION INTRAVENOUS AS NEEDED
Status: DISCONTINUED | OUTPATIENT
Start: 2020-07-07 | End: 2020-07-11 | Stop reason: HOSPADM

## 2020-07-07 RX ORDER — HYDROMORPHONE HYDROCHLORIDE 1 MG/ML
0.2 INJECTION, SOLUTION INTRAMUSCULAR; INTRAVENOUS; SUBCUTANEOUS
Status: DISCONTINUED | OUTPATIENT
Start: 2020-07-07 | End: 2020-07-07 | Stop reason: HOSPADM

## 2020-07-07 RX ORDER — LIDOCAINE HYDROCHLORIDE 20 MG/ML
INJECTION, SOLUTION EPIDURAL; INFILTRATION; INTRACAUDAL; PERINEURAL AS NEEDED
Status: DISCONTINUED | OUTPATIENT
Start: 2020-07-07 | End: 2020-07-07 | Stop reason: HOSPADM

## 2020-07-07 RX ORDER — HEPARIN SODIUM 5000 [USP'U]/ML
5000 INJECTION, SOLUTION INTRAVENOUS; SUBCUTANEOUS EVERY 8 HOURS
Status: DISCONTINUED | OUTPATIENT
Start: 2020-07-07 | End: 2020-07-09

## 2020-07-07 RX ORDER — PROPOFOL 10 MG/ML
INJECTION, EMULSION INTRAVENOUS AS NEEDED
Status: DISCONTINUED | OUTPATIENT
Start: 2020-07-07 | End: 2020-07-07 | Stop reason: HOSPADM

## 2020-07-07 RX ORDER — ONDANSETRON 4 MG/1
4 TABLET, ORALLY DISINTEGRATING ORAL
Status: DISCONTINUED | OUTPATIENT
Start: 2020-07-07 | End: 2020-07-11 | Stop reason: HOSPADM

## 2020-07-07 RX ORDER — OXYCODONE HYDROCHLORIDE 5 MG/1
5 TABLET ORAL
Status: DISCONTINUED | OUTPATIENT
Start: 2020-07-07 | End: 2020-07-11 | Stop reason: HOSPADM

## 2020-07-07 RX ORDER — SODIUM CHLORIDE 0.9 % (FLUSH) 0.9 %
5-40 SYRINGE (ML) INJECTION AS NEEDED
Status: DISCONTINUED | OUTPATIENT
Start: 2020-07-07 | End: 2020-07-07 | Stop reason: HOSPADM

## 2020-07-07 RX ORDER — LIDOCAINE HYDROCHLORIDE 10 MG/ML
0.1 INJECTION, SOLUTION EPIDURAL; INFILTRATION; INTRACAUDAL; PERINEURAL AS NEEDED
Status: DISCONTINUED | OUTPATIENT
Start: 2020-07-07 | End: 2020-07-07 | Stop reason: HOSPADM

## 2020-07-07 RX ORDER — SODIUM CHLORIDE, SODIUM LACTATE, POTASSIUM CHLORIDE, CALCIUM CHLORIDE 600; 310; 30; 20 MG/100ML; MG/100ML; MG/100ML; MG/100ML
125 INJECTION, SOLUTION INTRAVENOUS CONTINUOUS
Status: DISCONTINUED | OUTPATIENT
Start: 2020-07-07 | End: 2020-07-07 | Stop reason: HOSPADM

## 2020-07-07 RX ORDER — DIPHENHYDRAMINE HYDROCHLORIDE 50 MG/ML
12.5 INJECTION, SOLUTION INTRAMUSCULAR; INTRAVENOUS AS NEEDED
Status: DISCONTINUED | OUTPATIENT
Start: 2020-07-07 | End: 2020-07-07 | Stop reason: HOSPADM

## 2020-07-07 RX ORDER — MIDAZOLAM HYDROCHLORIDE 1 MG/ML
0.5 INJECTION, SOLUTION INTRAMUSCULAR; INTRAVENOUS
Status: DISCONTINUED | OUTPATIENT
Start: 2020-07-07 | End: 2020-07-07 | Stop reason: HOSPADM

## 2020-07-07 RX ORDER — HYDROCODONE BITARTRATE AND ACETAMINOPHEN 5; 325 MG/1; MG/1
1 TABLET ORAL AS NEEDED
Status: DISCONTINUED | OUTPATIENT
Start: 2020-07-07 | End: 2020-07-07 | Stop reason: HOSPADM

## 2020-07-07 RX ORDER — OXYCODONE HYDROCHLORIDE 5 MG/1
10 TABLET ORAL
Status: DISCONTINUED | OUTPATIENT
Start: 2020-07-07 | End: 2020-07-11 | Stop reason: HOSPADM

## 2020-07-07 RX ORDER — INSULIN LISPRO 100 [IU]/ML
INJECTION, SOLUTION INTRAVENOUS; SUBCUTANEOUS EVERY 6 HOURS
Status: DISCONTINUED | OUTPATIENT
Start: 2020-07-07 | End: 2020-07-11 | Stop reason: HOSPADM

## 2020-07-07 RX ORDER — ONDANSETRON 2 MG/ML
INJECTION INTRAMUSCULAR; INTRAVENOUS AS NEEDED
Status: DISCONTINUED | OUTPATIENT
Start: 2020-07-07 | End: 2020-07-07 | Stop reason: HOSPADM

## 2020-07-07 RX ORDER — SODIUM CHLORIDE 9 MG/ML
25 INJECTION, SOLUTION INTRAVENOUS CONTINUOUS
Status: DISCONTINUED | OUTPATIENT
Start: 2020-07-07 | End: 2020-07-07 | Stop reason: HOSPADM

## 2020-07-07 RX ORDER — BUPIVACAINE HYDROCHLORIDE 2.5 MG/ML
30 INJECTION, SOLUTION EPIDURAL; INFILTRATION; INTRACAUDAL ONCE
Status: DISCONTINUED | OUTPATIENT
Start: 2020-07-07 | End: 2020-07-07 | Stop reason: HOSPADM

## 2020-07-07 RX ADMIN — SODIUM CHLORIDE, SODIUM LACTATE, POTASSIUM CHLORIDE, AND CALCIUM CHLORIDE 125 ML/HR: 600; 310; 30; 20 INJECTION, SOLUTION INTRAVENOUS at 09:00

## 2020-07-07 RX ADMIN — FENTANYL CITRATE 50 MCG: 50 INJECTION, SOLUTION INTRAMUSCULAR; INTRAVENOUS at 10:21

## 2020-07-07 RX ADMIN — PIPERACILLIN AND TAZOBACTAM 3.38 G: 3; .375 INJECTION, POWDER, LYOPHILIZED, FOR SOLUTION INTRAVENOUS at 00:57

## 2020-07-07 RX ADMIN — IOHEXOL 50 ML: 240 INJECTION, SOLUTION INTRATHECAL; INTRAVASCULAR; INTRAVENOUS; ORAL at 00:16

## 2020-07-07 RX ADMIN — ACETAMINOPHEN 1000 MG: 500 TABLET ORAL at 14:39

## 2020-07-07 RX ADMIN — OXYCODONE 10 MG: 5 TABLET ORAL at 18:49

## 2020-07-07 RX ADMIN — MIDAZOLAM 2 MG: 1 INJECTION INTRAMUSCULAR; INTRAVENOUS at 09:53

## 2020-07-07 RX ADMIN — INSULIN LISPRO 2 UNITS: 100 INJECTION, SOLUTION INTRAVENOUS; SUBCUTANEOUS at 18:00

## 2020-07-07 RX ADMIN — PROPOFOL 150 MG: 10 INJECTION, EMULSION INTRAVENOUS at 09:59

## 2020-07-07 RX ADMIN — VANCOMYCIN HYDROCHLORIDE 2000 MG: 10 INJECTION, POWDER, LYOPHILIZED, FOR SOLUTION INTRAVENOUS at 03:00

## 2020-07-07 RX ADMIN — FENTANYL CITRATE 50 MCG: 50 INJECTION, SOLUTION INTRAMUSCULAR; INTRAVENOUS at 09:59

## 2020-07-07 RX ADMIN — OXYCODONE 10 MG: 5 TABLET ORAL at 14:39

## 2020-07-07 RX ADMIN — DEXAMETHASONE SODIUM PHOSPHATE 4 MG: 4 INJECTION, SOLUTION INTRAMUSCULAR; INTRAVENOUS at 10:14

## 2020-07-07 RX ADMIN — VANCOMYCIN HYDROCHLORIDE 1250 MG: 10 INJECTION, POWDER, LYOPHILIZED, FOR SOLUTION INTRAVENOUS at 10:21

## 2020-07-07 RX ADMIN — HYDROMORPHONE HYDROCHLORIDE 0.5 MG: 2 INJECTION, SOLUTION INTRAMUSCULAR; INTRAVENOUS; SUBCUTANEOUS at 10:26

## 2020-07-07 RX ADMIN — Medication 10 ML: at 22:00

## 2020-07-07 RX ADMIN — SODIUM CHLORIDE 75 ML/HR: 900 INJECTION, SOLUTION INTRAVENOUS at 02:29

## 2020-07-07 RX ADMIN — OXYCODONE 5 MG: 5 TABLET ORAL at 02:29

## 2020-07-07 RX ADMIN — SODIUM CHLORIDE 75 ML/HR: 900 INJECTION, SOLUTION INTRAVENOUS at 11:24

## 2020-07-07 RX ADMIN — OXYCODONE 10 MG: 5 TABLET ORAL at 06:49

## 2020-07-07 RX ADMIN — ACETAMINOPHEN 1000 MG: 500 TABLET ORAL at 19:42

## 2020-07-07 RX ADMIN — HYDROMORPHONE HYDROCHLORIDE 0.5 MG: 2 INJECTION, SOLUTION INTRAMUSCULAR; INTRAVENOUS; SUBCUTANEOUS at 10:35

## 2020-07-07 RX ADMIN — FENTANYL CITRATE 50 MCG: 50 INJECTION INTRAMUSCULAR; INTRAVENOUS at 09:17

## 2020-07-07 RX ADMIN — ROCURONIUM BROMIDE 30 MG: 10 SOLUTION INTRAVENOUS at 09:59

## 2020-07-07 RX ADMIN — VANCOMYCIN HYDROCHLORIDE 1250 MG: 10 INJECTION, POWDER, LYOPHILIZED, FOR SOLUTION INTRAVENOUS at 19:42

## 2020-07-07 RX ADMIN — INSULIN LISPRO 2 UNITS: 100 INJECTION, SOLUTION INTRAVENOUS; SUBCUTANEOUS at 06:43

## 2020-07-07 RX ADMIN — PIPERACILLIN AND TAZOBACTAM 3.38 G: 3; .375 INJECTION, POWDER, LYOPHILIZED, FOR SOLUTION INTRAVENOUS at 09:32

## 2020-07-07 RX ADMIN — PIPERACILLIN AND TAZOBACTAM 3.38 G: 3; .375 INJECTION, POWDER, LYOPHILIZED, FOR SOLUTION INTRAVENOUS at 18:50

## 2020-07-07 RX ADMIN — HEPARIN SODIUM 5000 UNITS: 5000 INJECTION INTRAVENOUS; SUBCUTANEOUS at 18:52

## 2020-07-07 RX ADMIN — ONDANSETRON HYDROCHLORIDE 4 MG: 2 INJECTION, SOLUTION INTRAMUSCULAR; INTRAVENOUS at 10:14

## 2020-07-07 RX ADMIN — INSULIN LISPRO 2 UNITS: 100 INJECTION, SOLUTION INTRAVENOUS; SUBCUTANEOUS at 11:51

## 2020-07-07 RX ADMIN — PROPOFOL 50 MG: 10 INJECTION, EMULSION INTRAVENOUS at 10:22

## 2020-07-07 RX ADMIN — HEPARIN SODIUM 5000 UNITS: 5000 INJECTION INTRAVENOUS; SUBCUTANEOUS at 11:27

## 2020-07-07 RX ADMIN — LIDOCAINE HYDROCHLORIDE 100 MG: 20 INJECTION, SOLUTION EPIDURAL; INFILTRATION; INTRACAUDAL; PERINEURAL at 09:59

## 2020-07-07 NOTE — BRIEF OP NOTE
Brief Postoperative Note    Patient: Chanel Alvarado. YOB: 1985  MRN: [id number]    Date of Procedure: 7/7/2020     Pre-Op Diagnosis:  Abdominal Wall Abscess. Post-Op Diagnosis:  Same. Procedure(s):   Drain Abdominal Wall Abscess. Surgeon(s):  Cleopatra Griffith MD    Surgical Assistant:  Ethan Cuevas SA    Anesthesia: General     Estimated Blood Loss (mL): Approx. 10 ml. Complications: None    Specimens:   ID Type Source Tests Collected by Time Destination   1 : abdominal wall abcess Wound Abdomen CULTURE, ANAEROBIC, CULTURE, WOUND W Alayna Denise MD 7/7/2020 1024 Microbiology        Implants: * No implants in log *    Drains: * No LDAs found *    Findings: Necrotic fat. No nectrotizing fasciitis. No enteric appearing drainage.     Electronically Signed by Emiliano Gresham MD on 7/7/2020 at 11:06 AM

## 2020-07-07 NOTE — PROGRESS NOTES
Bedside shift change report given to [facility] PAT CANDELARIA (oncoming nurse) by Annette Maldonado RN (offgoing nurse). Report included the following information SBAR, Kardex, ED Summary, Intake/Output, MAR and Recent Results.

## 2020-07-07 NOTE — ED TRIAGE NOTES
Arrives from home, wheeled from triage, reporting abdominal pain around 1700 this evening.  Patient had an appendectomy 6/24, and is now draining, foul drainage

## 2020-07-07 NOTE — ED PROVIDER NOTES
Guzman Garrison is a 30 y.o. male  who presents by private vehicle to ER with c/o Patient presents with:  Post OP Complication  Abdominal Pain  Patient had appendectomy on 6/25. Patient reports abdominal pain with no foul smelling drainage from incison in LLQ. Patient denies fever or chills. Patient is on augmentin. He specifically denies any fevers, chills, nausea, vomiting, chest pain, shortness of breath, headache, rash, diarrhea, abdominal pain, urinary/bowel changes, sweating or weight loss. PCP: Lilli Villalobos NP   PMHx significant for: Past Medical History:  No date: Diabetes ([facility]  7/22/2011: Type II or unspecified type diabetes mellitus without   mention of complication, uncontrolled   PSHx significant for: Past Surgical History:  06/25/2020: HX APPENDECTOMY  Social Hx: Tobacco use: Social History    Tobacco Use      Smoking status: Never Smoker      Smokeless tobacco: Never Used  ; EtOH use: The patient states he drinks 0 per week.; Illicit Drug use: Allergies:  No Known Allergies    There are no other complaints, changes or physical findings at this time.               Past Medical History:   Diagnosis Date    Diabetes ([facility]     Type II or unspecified type diabetes mellitus without mention of complication, uncontrolled 7/22/2011       Past Surgical History:   Procedure Laterality Date    HX APPENDECTOMY  06/25/2020         Family History:   Problem Relation Age of Onset    Hypertension Father     Diabetes Maternal Grandmother        Social History     Socioeconomic History    Marital status:      Spouse name: Not on file    Number of children: Not on file    Years of education: Not on file    Highest education level: Not on file   Occupational History    Not on file   Social Needs    Financial resource strain: Not on file    Food insecurity     Worry: Not on file     Inability: Not on file    Transportation needs     Medical: Not on file     Non-medical: Not on file   Tobacco Use    Smoking status: Never Smoker    Smokeless tobacco: Never Used   Substance and Sexual Activity    Alcohol use: No    Drug use: No    Sexual activity: Not on file   Lifestyle    Physical activity     Days per week: Not on file     Minutes per session: Not on file    Stress: Not on file   Relationships    Social connections     Talks on phone: Not on file     Gets together: Not on file     Attends [employer] service: Not on file     Active member of club or organization: Not on file     Attends meetings of clubs or organizations: Not on file     Relationship status: Not on file    Intimate partner violence     Fear of current or ex partner: Not on file     Emotionally abused: Not on file     Physically abused: Not on file     Forced sexual activity: Not on file   Other Topics Concern    Not on file   Social History Narrative    ** Merged History Encounter **              ALLERGIES: Patient has no known allergies. Review of Systems   Constitutional: Negative for activity change, appetite change, chills and fever. HENT: Negative for congestion and sore throat. Respiratory: Negative for cough and shortness of breath. Cardiovascular: Negative for chest pain. Gastrointestinal: Positive for abdominal pain. Negative for diarrhea, nausea and vomiting. Genitourinary: Negative for dysuria. Musculoskeletal: Negative for arthralgias and myalgias. Skin: Negative for color change. Neurological: Negative for dizziness. Psychiatric/Behavioral: The patient is not nervous/anxious. All other systems reviewed and are negative. Vitals:    07/06/20 2143   BP: 112/76   Pulse: 95   Resp: 16   Temp: 98.3 °F (36.8 °C)   SpO2: 99%            Physical Exam  Vitals signs and nursing note reviewed. Constitutional:       Appearance: He is well-developed. HENT:      Head: Normocephalic and atraumatic.       Right Ear: External ear normal.      Left Ear: External ear normal. Mouth/Throat:      Pharynx: No oropharyngeal exudate. Eyes:      General: No scleral icterus. Right eye: No discharge. Left eye: No discharge. Conjunctiva/sclera: Conjunctivae normal.      Pupils: Pupils are equal, round, and reactive to light. Neck:      Musculoskeletal: Normal range of motion and neck supple. Thyroid: No thyromegaly. Trachea: No tracheal deviation. Cardiovascular:      Rate and Rhythm: Normal rate and regular rhythm. Heart sounds: Normal heart sounds. No murmur. Pulmonary:      Effort: Pulmonary effort is normal. No respiratory distress. Breath sounds: Normal breath sounds. No wheezing or rales. Abdominal:      General: Bowel sounds are normal. There is no distension. Palpations: Abdomen is soft. Tenderness: There is abdominal tenderness in the left lower quadrant. There is no guarding or rebound. Musculoskeletal: Normal range of motion. General: No tenderness. Lymphadenopathy:      Cervical: No cervical adenopathy. Skin:     General: Skin is warm. Findings: No erythema or rash. Neurological:      Mental Status: He is alert and oriented to person, place, and time. Cranial Nerves: No cranial nerve deficit. Coordination: Coordination normal.   Psychiatric:         Behavior: Behavior normal.         Thought Content: Thought content normal.         Judgment: Judgment normal.          MDM         Procedures                       CONSULT NOTE:   11:37 PM  Kayleigh Daniel PA-C spoke with Dr. Lucho Sun,   Specialty: Surgery  Discussed pt's hx, disposition, and available diagnostic and imaging results. Reviewed care plans. Consultant agrees with plans as outlined. Will see patient for further evaluation. 12:24 AM  Patient is being admitted to the hospital.  The results of their tests and reasons for their admission have been discussed with them and/or available family.   They convey agreement and understanding for the need to be admitted and for their admission diagnosis. Consultation has been made with the inpatient physician specialist for hospitalization. LABORATORY TESTS:  Recent Results (from the past 12 hour(s))   CBC WITH AUTOMATED DIFF    Collection Time: 07/06/20 10:52 PM   Result Value Ref Range    WBC 31.2 (H) 4.1 - 11.1 K/uL    RBC 4.99 4. 10 - 5.70 M/uL    HGB 13.2 12.1 - 17.0 g/dL    HCT 41.6 36.6 - 50.3 %    MCV 83.4 80.0 - 99.0 FL    MCH 26.5 26.0 - 34.0 PG    MCHC 31.7 30.0 - 36.5 g/dL    RDW 12.4 11.5 - 14.5 %    PLATELET 264 (H) 919 - 400 K/uL    MPV 10.4 8.9 - 12.9 FL    NRBC 0.0 0  WBC    ABSOLUTE NRBC 0.00 0.00 - 0.01 K/uL    NEUTROPHILS 84 (H) 32 - 75 %    BAND NEUTROPHILS 4 0 - 6 %    LYMPHOCYTES 4 (L) 12 - 49 %    MONOCYTES 6 5 - 13 %    EOSINOPHILS 1 0 - 7 %    BASOPHILS 1 0 - 1 %    IMMATURE GRANULOCYTES 0 %    ABS. NEUTROPHILS 27.5 (H) 1.8 - 8.0 K/UL    ABS. LYMPHOCYTES 1.2 0.8 - 3.5 K/UL    ABS. MONOCYTES 1.9 (H) 0.0 - 1.0 K/UL    ABS. EOSINOPHILS 0.3 0.0 - 0.4 K/UL    ABS. BASOPHILS 0.3 (H) 0.0 - 0.1 K/UL    ABS. IMM. GRANS. 0.0 K/UL    DF SMEAR SCANNED      PLATELET COMMENTS Large Platelets      RBC COMMENTS NORMOCYTIC, NORMOCHROMIC      RBC COMMENTS TOXIC GRANULATION     METABOLIC PANEL, COMPREHENSIVE    Collection Time: 07/06/20 10:52 PM   Result Value Ref Range    Sodium 134 (L) 136 - 145 mmol/L    Potassium 3.8 3.5 - 5.1 mmol/L    Chloride 101 97 - 108 mmol/L    CO2 25 21 - 32 mmol/L    Anion gap 8 5 - 15 mmol/L    Glucose 168 (H) 65 - 100 mg/dL    BUN 13 6 - 20 MG/DL    Creatinine 0.93 0.70 - 1.30 MG/DL    BUN/Creatinine ratio 14 12 - 20      GFR est AA >60 >60 ml/min/1.73m2    GFR est non-AA >60 >60 ml/min/1.73m2    Calcium 9.7 8.5 - 10.1 MG/DL    Bilirubin, total 0.5 0.2 - 1.0 MG/DL    ALT (SGPT) 21 12 - 78 U/L    AST (SGOT) 15 15 - 37 U/L    Alk.  phosphatase 118 (H) 45 - 117 U/L    Protein, total 9.3 (H) 6.4 - 8.2 g/dL    Albumin 2.7 (L) 3.5 - 5.0 g/dL    Globulin 6.6 (H) 2.0 - 4.0 g/dL    A-G Ratio 0.4 (L) 1.1 - 2.2     CULTURE, WOUND W GRAM STAIN    Collection Time: 07/06/20 10:52 PM   Result Value Ref Range    Special Requests: NO SPECIAL REQUESTS      GRAM STAIN RARE WBCS SEEN      GRAM STAIN FEW GRAM POSITIVE COCCI IN PAIRS      GRAM STAIN FEW GRAM POSITIVE COCCI IN CLUSTERS      Culture result: PENDING        IMAGING RESULTS:  pending    MEDICATIONS GIVEN:  Medications   sodium chloride 0.9 % bolus infusion 100 mL (has no administration in time range)   iopamidoL (ISOVUE-370) 76 % injection 100 mL (has no administration in time range)   sodium chloride (NS) flush 10 mL (has no administration in time range)   piperacillin-tazobactam (ZOSYN) 3.375 g in 0.9% sodium chloride (MBP/ADV) 100 mL (has no administration in time range)   iohexoL (OMNIPAQUE) 240 mg iodine/mL solution 50 mL (has no administration in time range)   sodium chloride 0.9 % bolus infusion 1,000 mL (1,000 mL IntraVENous New Bag 7/6/20 9792)       IMPRESSION:  1. Postoperative surgical complication involving skin associated with non-dermatologic procedure, unspecified complication    2. Leukocytosis, unspecified type    3. Hyperglycemia    4. Post-operative wound abscess        PLAN:  1.  Admit to surgical services

## 2020-07-07 NOTE — PROGRESS NOTES
Bedside and Verbal shift change report given to Ynes Baumann RN (oncoming nurse) by Kuldeep Ty RN (offgoing nurse). Report included the following information SBAR, Kardex, Procedure Summary, Intake/Output, MAR and Recent Results.

## 2020-07-07 NOTE — PROGRESS NOTES
1700-This RN and PCT asked if patient was ready to get up in walk. Patient currently refusing to walk and waiting on wife to bring his clothes and will walk once wife is here. 1900Jacsoren Simmons RN and Rosa Sinclair RN asked the patient to walk again. Patient still waiting on wife and refused until wife gets here.

## 2020-07-07 NOTE — ED NOTES
Unable to get weight at this time, pt having difficulty getting on to standing scale. Requesting for weight to be checked in IP room.

## 2020-07-07 NOTE — ANESTHESIA POSTPROCEDURE EVALUATION
Procedure(s):  DRAINAGE ABDOMINAL WALL  ABCESS. general    <BSHSIANPOST>    INITIAL Post-op Vital signs:   Vitals Value Taken Time   /81 7/7/2020 11:30 AM   Temp 36.7 °C (98.1 °F) 7/7/2020 11:13 AM   Pulse 71 7/7/2020 11:33 AM   Resp 13 7/7/2020 11:33 AM   SpO2 99 % 7/7/2020 11:33 AM   Vitals shown include unvalidated device data.

## 2020-07-07 NOTE — PROGRESS NOTES
Zosyn dose adjustment  Indication:  intra-abdominal infection  Current regimen:  3.375gm q6h  Abx regimen:  vancomycin  Recent Labs     20  2252   WBC 31.2*   CREA 0.93   BUN 13     Est CrCl: >130 ml/min  Temp (24hrs), Av °F (37.2 °C), Min:98.3 °F (36.8 °C), Max:99.4 °F (37.4 °C)    Cultures: pemding    Plan: Change to 3.375gm q8h with extended perfusion time. **close i-vent after initial review. Remove this text prior to posting to note.

## 2020-07-07 NOTE — CONSULTS
Patient seen at request of Dr. Joleen Edge. Alicia Ortega. is an 30 y.o. male who presents with an abscess of the abdominal wall. Mr. Ramonita Warren is s/p laparoscopic appendectomy on June 25, 2020. Discharged to home on June 27, 2020. Last seen on July 1, 2020. Doing well until today when he noted drainage from the LLQ incision. Drainage described as purulent. No fevers or chills. No nausea or vomitting. No shortness of breath or chest pain. He has otherwise been in his usual state of health. Allergies - Patient has no known allergies. Meds - Reviewed. PMH -   Past Medical History:   Diagnosis Date    Abscess 7/6/2020    Diabetes ([facility]     S/P laparoscopic appendectomy 7/6/2020    Type II or unspecified type diabetes mellitus without mention of complication, uncontrolled 7/22/2011     Fam Hx -   Family History   Problem Relation Age of Onset    Hypertension Father     Diabetes Maternal Grandmother      PSH -   Past Surgical History:   Procedure Laterality Date    HX APPENDECTOMY  06/25/2020     Soc Hx -   Social History     Tobacco Use    Smoking status: Never Smoker    Smokeless tobacco: Never Used   Substance Use Topics    Alcohol use: No     Patient is a well developed, well nourished man in no acute distress. Visit Vitals  /76 (BP 1 Location: Left arm)   Pulse 95   Temp 98.3 °F (36.8 °C)   Resp 16   SpO2 99%     HEENT: Anicteric. Neck: Supple without palpable lymphadenopathy. Cor: RRR. Lungs: Bilateral breath sounds. Clear to auscultation. Abd: Soft. Non-distended. Tender in LLQ. No guarding or rebound. Purulent appearing drainage is noted at the LLQ trocar site. Surrounding cellulitis and induration of the abdominal wall. Ext: No edema.   Neuro: Grossly Non focal.     Labs -   Recent Results (from the past 24 hour(s))   CBC WITH AUTOMATED DIFF    Collection Time: 07/06/20 10:52 PM   Result Value Ref Range    WBC 31.2 (H) 4.1 - 11.1 K/uL    RBC 4. 99 4. 10 - 5.70 M/uL    HGB 13.2 12.1 - 17.0 g/dL    HCT 41.6 36.6 - 50.3 %    MCV 83.4 80.0 - 99.0 FL    MCH 26.5 26.0 - 34.0 PG    MCHC 31.7 30.0 - 36.5 g/dL    RDW 12.4 11.5 - 14.5 %    PLATELET 841 (H) 236 - 400 K/uL    MPV 10.4 8.9 - 12.9 FL    NRBC 0.0 0  WBC    ABSOLUTE NRBC 0.00 0.00 - 0.01 K/uL    NEUTROPHILS 84 (H) 32 - 75 %    BAND NEUTROPHILS 4 0 - 6 %    LYMPHOCYTES 4 (L) 12 - 49 %    MONOCYTES 6 5 - 13 %    EOSINOPHILS 1 0 - 7 %    BASOPHILS 1 0 - 1 %    IMMATURE GRANULOCYTES 0 %    ABS. NEUTROPHILS 27.5 (H) 1.8 - 8.0 K/UL    ABS. LYMPHOCYTES 1.2 0.8 - 3.5 K/UL    ABS. MONOCYTES 1.9 (H) 0.0 - 1.0 K/UL    ABS. EOSINOPHILS 0.3 0.0 - 0.4 K/UL    ABS. BASOPHILS 0.3 (H) 0.0 - 0.1 K/UL    ABS. IMM. GRANS. 0.0 K/UL    DF SMEAR SCANNED      PLATELET COMMENTS Large Platelets      RBC COMMENTS NORMOCYTIC, NORMOCHROMIC      RBC COMMENTS TOXIC GRANULATION     METABOLIC PANEL, COMPREHENSIVE    Collection Time: 07/06/20 10:52 PM   Result Value Ref Range    Sodium 134 (L) 136 - 145 mmol/L    Potassium 3.8 3.5 - 5.1 mmol/L    Chloride 101 97 - 108 mmol/L    CO2 25 21 - 32 mmol/L    Anion gap 8 5 - 15 mmol/L    Glucose 168 (H) 65 - 100 mg/dL    BUN 13 6 - 20 MG/DL    Creatinine 0.93 0.70 - 1.30 MG/DL    BUN/Creatinine ratio 14 12 - 20      GFR est AA >60 >60 ml/min/1.73m2    GFR est non-AA >60 >60 ml/min/1.73m2    Calcium 9.7 8.5 - 10.1 MG/DL    Bilirubin, total 0.5 0.2 - 1.0 MG/DL    ALT (SGPT) 21 12 - 78 U/L    AST (SGOT) 15 15 - 37 U/L    Alk.  phosphatase 118 (H) 45 - 117 U/L    Protein, total 9.3 (H) 6.4 - 8.2 g/dL    Albumin 2.7 (L) 3.5 - 5.0 g/dL    Globulin 6.6 (H) 2.0 - 4.0 g/dL    A-G Ratio 0.4 (L) 1.1 - 2.2     CULTURE, WOUND W GRAM STAIN    Collection Time: 07/06/20 10:52 PM   Result Value Ref Range    Special Requests: NO SPECIAL REQUESTS      GRAM STAIN RARE WBCS SEEN      GRAM STAIN FEW GRAM POSITIVE COCCI IN PAIRS      GRAM STAIN FEW GRAM POSITIVE COCCI IN CLUSTERS      Culture result: PENDING Imp: Mr. To Allen is a 30 y.o. male, s/p laparoscopic appendectomy, with an abdominal wall abscess. Plan: 1. Admit   2. NPO for now. 3. IVF at 75 ml/hour. 4. CT scan abdomen/pelvis with po/IV contrast.   5. IV abx - Zosyn and Vancomycin. 6. Accuchecks and sliding scale insulin. 7. Diabetes management to see. 8. Pain medication and anti-emetics as needed. 9. Local wound care.     10. Labs in AM.

## 2020-07-07 NOTE — H&P
Date of Surgery Update:  Chanel Parnell was seen and examined. History and physical has been reviewed. The patient has been examined. There have been no significant clinical changes since the completion of the originally dated History and Physical.    Signed By: Emiliano Gresham MD     July 7, 2020 8:37 AM         Please note from the office and include the additional information below:    Past Medical History  Past Medical History:   Diagnosis Date    Abscess 7/6/2020    Diabetes ([address]     S/P laparoscopic appendectomy 7/6/2020    Type II or unspecified type diabetes mellitus without mention of complication, uncontrolled 7/22/2011        Past Surgical History  Past Surgical History:   Procedure Laterality Date    HX APPENDECTOMY  06/25/2020        Social History  The patient Chanel Parnell  reports that he has never smoked. He has never used smokeless tobacco. He reports that he does not drink alcohol or use drugs.      Family History  Family History   Problem Relation Age of Onset    Hypertension Father     Diabetes Maternal Grandmother

## 2020-07-07 NOTE — OP NOTES
[facility]  OPERATIVE REPORT    Name:  Kiki Howard  MR#:  [id number]  :  1985  ACCOUNT #:  [de-identified]  DATE OF SERVICE:  2020    PREOPERATIVE DIAGNOSIS:  Abdominal wall abscess. POSTOPERATIVE DIAGNOSIS:  Abdominal wall abscess. PROCEDURE PERFORMED:  Drain abdominal wall abscess. SURGEON:  Robert Nichole MD    ASSISTANT:  David Varela SA    ANESTHESIA:  General endotracheal.    COMPLICATIONS:  None. SPECIMENS REMOVED:  Abscess contents for aerobic and anaerobic culture and sensitivity. IMPLANTS:  None. ESTIMATED BLOOD LOSS:   Approximately  10 mL. IV FLUIDS:  Crystalloids 600 mL. DRAINS:  None. INDICATIONS FOR SURGERY:  The patient is a 44-year-old man, who is status post laparoscopic appendectomy on 2020. He recently developed purulent-appearing drainage from the left lower quadrant surgical incision. Mr. Missael Rivas is brought to the operating room at this time for improved drainage of the abdominal wall abscess. The risks of the procedure, including but not limited to, bleeding, further infection and the need for further surgery were discussed in detail with the patient. Mr. Missael Rivas understood and wished to proceed. PROCEDURE:  After consent was obtained, the patient was brought to the operating room, where he was placed in the supine position on the operating room table. Following the induction of an adequate level of general anesthesia via the endotracheal tube, compression devices were placed on both lower extremities. The abdomen was prepped with ChloraPrep and draped as a sterile field. Local anesthetic was infiltrated and the left lower quadrant incision was probed and was found to track medially. Purulent-appearing drainage was noted from the wound and this was sampled and submitted for aerobic and anaerobic culture and sensitivity. The incision was extended medially several centimeters.   Necrotic-appearing skin was sharply debrided back to viable tissue. Necrotic fat was also noted and this was debrided back to healthy-appearing tissue as well. There was no evidence of necrotizing fasciitis. However, the abscess cavity was found to extend beneath the fascia. It should also be noted that no enteric-appearing drainage was identified. The wound was then irrigated with three liters of saline using the PulsaVac. The wound was inspected and there did not appear to be additional necrotic-appearing tissue. Furthermore, the wound was found to be hemostatic. Additional local anesthetic was infiltrated and the wound was packed with saline-soaked Barbie. Dry dressings were applied. The patient was awakened from his general anesthetic and extubated in the operating room. He was transferred to his bed and brought to the recovery room in stable condition having tolerated the procedure well. At the conclusion of the procedure, all sponge counts, instrument counts and needle counts were reported as correct x2.       Yony Gonzales MD [id number]  D:  07/07/2020 11:16  T:  07/07/2020 16:07  JOB #:  [id number]  CC:  FREDDY Romano MD

## 2020-07-07 NOTE — PERIOP NOTES
Patient to surgical suite with a left lower quadrant wound. Draining green/greyish blood tinged puss with a foul smell. Culture taken prior to skin prep. Surgery performed. Wound packed with 3 inch saline soaked gauze. 4x4's and abdominal pad and tape placed on wound at end of procedure.

## 2020-07-07 NOTE — PROGRESS NOTES
Pharmacist Note - Vancomycin Dosing    Consult provided for this 30 y.o. male for indication of intra-abdominal infection   Antibiotic regimen(s): zosyn  Patient on vancomycin PTA? NO     Recent Labs     20  2252   WBC 31.2*   CREA 0.93   BUN 13     Frequency of BMP: daily x 3 then every other day  Height: 188 cm  Weight: 97.5 kg  Est CrCl: >100 ml/min; UO:   ml/kg/hr  Temp (24hrs), Av °F (37.2 °C), Min:98.3 °F (36.8 °C), Max:99.4 °F (37.4 °C)    Cultures:pending    Goal trough = 15 - 20 mcg/mL    Therapy will be initiated with a loading dose of 2gm IV x 1 to be followed by a maintenance dose of 1250 mg IV every 8 hours. Pharmacy to follow patient daily and order levels / make dose adjustments as appropriate.

## 2020-07-07 NOTE — PROGRESS NOTES
Mr. Amanda Garcia has no complaints. Tm 99.4 HR: 76 BP: 133/87 Resp Rate: 16 99% sat on room air. CT scan abdomen/pelvis with po/IV contrast - Left anterior abdominal wall subcutaneous inflammation likely representing postsurgical change. There is no organized abscess or drainable collection in the anterior abdominal wall. Surgical changes in the right lower quadrant following recent appendectomy with a small amount of inflammation and a postoperative collection versus abscess measuring 1.2 cm. Reviewed CT scan. Mr. Amanda Garcia should benefit from improved drainage of the abdominal wall abscess. Discussed procedure with him including risks of bleeding, further infection, need for further surgery. He understands and wishes to proceed. Needs consent. NPO for now. Continue IV abx - Zosyn and Vancomycin. Pain medication and anti-emetics as needed. Diabetes management to see.

## 2020-07-07 NOTE — ROUTINE PROCESS
TRANSFER - OUT REPORT:    Verbal report given to Tray Blake RN(name) on [facility].  being transferred to (unit) for routine progression of care       Report consisted of patients Situation, Background, Assessment and   Recommendations(SBAR). Information from the following report(s) SBAR, ED Summary, [facility] and Recent Results was reviewed with the receiving nurse. Lines:   Peripheral IV 07/06/20 Right;Upper Arm (Active)   Site Assessment Clean, dry, & intact 7/6/2020 10:55 PM   Phlebitis Assessment 0 7/6/2020 10:55 PM   Infiltration Assessment 0 7/6/2020 10:55 PM   Dressing Status Clean, dry, & intact 7/6/2020 10:55 PM   Dressing Type Transparent 7/6/2020 10:55 PM   Hub Color/Line Status Patent; Flushed;Capped;Pink 7/6/2020 10:55 PM   Action Taken Blood drawn 7/6/2020 10:55 PM       Peripheral IV 07/06/20 Right Antecubital (Active)        Opportunity for questions and clarification was provided.       Patient transported with:   Registered Nurse

## 2020-07-08 LAB
ANION GAP SERPL CALC-SCNC: 6 MMOL/L (ref 5–15)
BASOPHILS # BLD: 0 K/UL (ref 0–0.1)
BASOPHILS NFR BLD: 0 % (ref 0–1)
BUN SERPL-MCNC: 14 MG/DL (ref 6–20)
BUN/CREAT SERPL: 20 (ref 12–20)
CALCIUM SERPL-MCNC: 9 MG/DL (ref 8.5–10.1)
CHLORIDE SERPL-SCNC: 103 MMOL/L (ref 97–108)
CO2 SERPL-SCNC: 25 MMOL/L (ref 21–32)
CREAT SERPL-MCNC: 0.69 MG/DL (ref 0.7–1.3)
DATE LAST DOSE: ABNORMAL
DIFFERENTIAL METHOD BLD: ABNORMAL
EOSINOPHIL # BLD: 0.3 K/UL (ref 0–0.4)
EOSINOPHIL NFR BLD: 1 % (ref 0–7)
ERYTHROCYTE [DISTWIDTH] IN BLOOD BY AUTOMATED COUNT: 12.4 % (ref 11.5–14.5)
GLUCOSE BLD STRIP.AUTO-MCNC: 111 MG/DL (ref 65–100)
GLUCOSE BLD STRIP.AUTO-MCNC: 118 MG/DL (ref 65–100)
GLUCOSE BLD STRIP.AUTO-MCNC: 130 MG/DL (ref 65–100)
GLUCOSE BLD STRIP.AUTO-MCNC: 139 MG/DL (ref 65–100)
GLUCOSE SERPL-MCNC: 127 MG/DL (ref 65–100)
HCT VFR BLD AUTO: 37 % (ref 36.6–50.3)
HGB BLD-MCNC: 11.6 G/DL (ref 12.1–17)
IMM GRANULOCYTES # BLD AUTO: 0.3 K/UL (ref 0–0.04)
IMM GRANULOCYTES NFR BLD AUTO: 1 % (ref 0–0.5)
LYMPHOCYTES # BLD: 2.5 K/UL (ref 0.8–3.5)
LYMPHOCYTES NFR BLD: 9 % (ref 12–49)
MAGNESIUM SERPL-MCNC: 2.3 MG/DL (ref 1.6–2.4)
MCH RBC QN AUTO: 26.1 PG (ref 26–34)
MCHC RBC AUTO-ENTMCNC: 31.4 G/DL (ref 30–36.5)
MCV RBC AUTO: 83.1 FL (ref 80–99)
MONOCYTES # BLD: 1.4 K/UL (ref 0–1)
MONOCYTES NFR BLD: 5 % (ref 5–13)
NEUTS SEG # BLD: 22.8 K/UL (ref 1.8–8)
NEUTS SEG NFR BLD: 84 % (ref 32–75)
NRBC # BLD: 0 K/UL (ref 0–0.01)
NRBC BLD-RTO: 0 PER 100 WBC
PHOSPHATE SERPL-MCNC: 3.2 MG/DL (ref 2.6–4.7)
PLATELET # BLD AUTO: 556 K/UL (ref 150–400)
PLATELET COMMENTS,PCOM: ABNORMAL
PMV BLD AUTO: 10 FL (ref 8.9–12.9)
POTASSIUM SERPL-SCNC: 4.1 MMOL/L (ref 3.5–5.1)
RBC # BLD AUTO: 4.45 M/UL (ref 4.1–5.7)
RBC MORPH BLD: ABNORMAL
REPORTED DOSE,DOSE: ABNORMAL UNITS
REPORTED DOSE/TIME,TMG: ABNORMAL
SERVICE CMNT-IMP: ABNORMAL
SODIUM SERPL-SCNC: 134 MMOL/L (ref 136–145)
VANCOMYCIN TROUGH SERPL-MCNC: 10.5 UG/ML (ref 5–10)
WBC # BLD AUTO: 27.3 K/UL (ref 4.1–11.1)

## 2020-07-08 PROCEDURE — 82962 GLUCOSE BLOOD TEST: CPT

## 2020-07-08 PROCEDURE — 74011250636 HC RX REV CODE- 250/636: Performed by: SURGERY

## 2020-07-08 PROCEDURE — 74011000258 HC RX REV CODE- 258: Performed by: SURGERY

## 2020-07-08 PROCEDURE — 84100 ASSAY OF PHOSPHORUS: CPT

## 2020-07-08 PROCEDURE — 83735 ASSAY OF MAGNESIUM: CPT

## 2020-07-08 PROCEDURE — 80048 BASIC METABOLIC PNL TOTAL CA: CPT

## 2020-07-08 PROCEDURE — 85025 COMPLETE CBC W/AUTO DIFF WBC: CPT

## 2020-07-08 PROCEDURE — 65270000032 HC RM SEMIPRIVATE

## 2020-07-08 PROCEDURE — 36415 COLL VENOUS BLD VENIPUNCTURE: CPT

## 2020-07-08 PROCEDURE — 74011250637 HC RX REV CODE- 250/637: Performed by: SURGERY

## 2020-07-08 PROCEDURE — 77010033678 HC OXYGEN DAILY

## 2020-07-08 PROCEDURE — 80202 ASSAY OF VANCOMYCIN: CPT

## 2020-07-08 RX ORDER — VANCOMYCIN/0.9 % SOD CHLORIDE 1.5G/250ML
1500 PLASTIC BAG, INJECTION (ML) INTRAVENOUS EVERY 8 HOURS
Status: DISCONTINUED | OUTPATIENT
Start: 2020-07-08 | End: 2020-07-10

## 2020-07-08 RX ADMIN — VANCOMYCIN HYDROCHLORIDE 1250 MG: 10 INJECTION, POWDER, LYOPHILIZED, FOR SOLUTION INTRAVENOUS at 03:19

## 2020-07-08 RX ADMIN — ACETAMINOPHEN 1000 MG: 500 TABLET ORAL at 23:43

## 2020-07-08 RX ADMIN — MORPHINE SULFATE 2 MG: 2 INJECTION, SOLUTION INTRAMUSCULAR; INTRAVENOUS at 14:00

## 2020-07-08 RX ADMIN — OXYCODONE 5 MG: 5 TABLET ORAL at 00:01

## 2020-07-08 RX ADMIN — Medication 10 ML: at 07:32

## 2020-07-08 RX ADMIN — PIPERACILLIN AND TAZOBACTAM 3.38 G: 3; .375 INJECTION, POWDER, LYOPHILIZED, FOR SOLUTION INTRAVENOUS at 09:35

## 2020-07-08 RX ADMIN — VANCOMYCIN HYDROCHLORIDE 1500 MG: 10 INJECTION, POWDER, LYOPHILIZED, FOR SOLUTION INTRAVENOUS at 19:36

## 2020-07-08 RX ADMIN — VANCOMYCIN HYDROCHLORIDE 1250 MG: 10 INJECTION, POWDER, LYOPHILIZED, FOR SOLUTION INTRAVENOUS at 11:38

## 2020-07-08 RX ADMIN — ACETAMINOPHEN 1000 MG: 500 TABLET ORAL at 17:19

## 2020-07-08 RX ADMIN — ACETAMINOPHEN 1000 MG: 500 TABLET ORAL at 09:33

## 2020-07-08 RX ADMIN — OXYCODONE 5 MG: 5 TABLET ORAL at 20:52

## 2020-07-08 RX ADMIN — PIPERACILLIN AND TAZOBACTAM 3.38 G: 3; .375 INJECTION, POWDER, LYOPHILIZED, FOR SOLUTION INTRAVENOUS at 18:45

## 2020-07-08 RX ADMIN — OXYCODONE 10 MG: 5 TABLET ORAL at 12:40

## 2020-07-08 RX ADMIN — OXYCODONE 5 MG: 5 TABLET ORAL at 04:03

## 2020-07-08 RX ADMIN — OXYCODONE 5 MG: 5 TABLET ORAL at 07:30

## 2020-07-08 RX ADMIN — ONDANSETRON 4 MG: 4 TABLET, ORALLY DISINTEGRATING ORAL at 14:00

## 2020-07-08 RX ADMIN — Medication 10 ML: at 14:00

## 2020-07-08 RX ADMIN — Medication 10 ML: at 22:00

## 2020-07-08 RX ADMIN — PIPERACILLIN AND TAZOBACTAM 3.38 G: 3; .375 INJECTION, POWDER, LYOPHILIZED, FOR SOLUTION INTRAVENOUS at 01:40

## 2020-07-08 RX ADMIN — ACETAMINOPHEN 1000 MG: 500 TABLET ORAL at 03:19

## 2020-07-08 NOTE — DIABETES MGMT
NEERU DICKINSON  CLINICAL NURSE SPECIALIST CONSULT  [facility]    INITIAL NOTE    Presentation   Digna Lebron. is a 30 y.o. male admitted with abdominal wall abscess, s/p abdominal wall abscess drainage. He is s/p appendectomy on 6/25/20 and reported to ED with abdominal pain. PMH: DM2- A1C 10.5%  Current clinical course has been complicated by infection from abdomen. Findings to date include: elevated WBC: 27.3      Diabetes: Patient has known Type 2 diabetes-A1C 10.5%, treated with no diabetes PTA. Family history positive for diabetes. Consulted by Provider for advanced diabetes nursing assessment and care, specifically related to   [] Transitioning off Nolene Ham   [x] Inpatient management strategy  [x] Home management assessment  [] Survival skill education    Diabetes-related medical history  Acute complications  NONE  Neurological complications  Peripheral neuropathy  Microvascular disease  none  Macrovascular disease  none  Other associated conditions     none    Diabetes medication history: NONE  Drug class Currently in use Discontinued Never used   Biguanide      DDP-4 inhibitor       Sulfonylurea      Thiazolidinedione      GLP-1 RA      SGLT-2 inhibitors      Basal insulin      Fixed Dose  Combinations      Bolus insulin        Subjective   I spoke with Mr. Zhang Oneill and his wife Tai Potts today. She was in the room with him. She did most of the talking and he interjected when I asked him questions. His wife Tai Potts states that within the past 4-6months he has been changing his diet and being more active in order to decrease his A1C level. He has become a vegetarian, consuming only vegetables and fruit. No red meat. She also stated he has lost some weight over the past several months but couldn't tell me an exact amount. She states that they are trying various organic remedies to supplement his diet such as sea peterson smoothies and various teas.   He works as a  and per wife \"is constantly moving/working out in the yard when not working\"  When asked about why he isn't taking medications for his diabetes, his wife stated that he used to be on Metformin but \"he slept all the time\" when he was taking it. He and she are wanting to go the route of diet and exercise to manage his diabetes. Patient reports the following home diabetes self-care practices:  Eating pattern-see above, he only drinks water and the teas. Physical activity pattern-see above    Monitoring pattern-I couldn't get a straight answer on how often he checks his BG-his wife states she checks it in the AM before he eats. Taking medications pattern-n/a  [] Consistent administration  [] Affordable    Social determinants of health impacting diabetes self-management practices   Concerned that you need to know more about how to stay healthy with diabetes    Objective   Physical exam  General Alert, oriented and in no acute distress/ill-appearing. Conversant and cooperative. Vital Signs   Visit Vitals  /86   Pulse 74   Temp 98.3 °F (36.8 °C)   Resp 16   Ht 6' 2\" (1.88 m)   Wt 97.5 kg (214 lb 15.2 oz)   SpO2 96%   BMI 27.60 kg/m²     Skin  Warm and dry. Heart   Regular rate and rhythm. No murmurs, rubs or gallops  Lungs  Clear to auscultation without rales or rhonchi  Extremities No foot wounds    Diabetic foot exam:    Left Foot     Visual Exam: normal    Pulse DP: 2+ (normal)   Filament test: reduced sensation  ; middle of bottom of foot     Right Foot   Visual Exam: normal    Pulse DP: 2+ (normal)   Filament test: normal sensation     DP & PT pulses +2.      Laboratory  Lab Results   Component Value Date/Time    Hemoglobin A1c 10.5 (H) 07/07/2020 03:45 AM    Hemoglobin A1c (POC) 14 07/16/2013 03:03 PM     No results found for: LDL, LDLC, DLDLP  Lab Results   Component Value Date/Time    Creatinine 0.69 (L) 07/08/2020 03:33 AM     Lab Results   Component Value Date/Time    Sodium 134 (L) 07/08/2020 03:33 AM    Potassium 4.1 07/08/2020 03:33 AM    Chloride 103 07/08/2020 03:33 AM    CO2 25 07/08/2020 03:33 AM    Anion gap 6 07/08/2020 03:33 AM    Glucose 127 (H) 07/08/2020 03:33 AM    BUN 14 07/08/2020 03:33 AM    Creatinine 0.69 (L) 07/08/2020 03:33 AM    BUN/Creatinine ratio 20 07/08/2020 03:33 AM    GFR est AA >60 07/08/2020 03:33 AM    GFR est non-AA >60 07/08/2020 03:33 AM    Calcium 9.0 07/08/2020 03:33 AM    Bilirubin, total 0.5 07/06/2020 10:52 PM    Alk. phosphatase 118 (H) 07/06/2020 10:52 PM    Protein, total 9.3 (H) 07/06/2020 10:52 PM    Albumin 2.7 (L) 07/06/2020 10:52 PM    Globulin 6.6 (H) 07/06/2020 10:52 PM    A-G Ratio 0.4 (L) 07/06/2020 10:52 PM    ALT (SGPT) 21 07/06/2020 10:52 PM     Lab Results   Component Value Date/Time    ALT (SGPT) 21 07/06/2020 10:52 PM       Factors affecting BG pattern  Factor Dose Comments   Nutrition:  Carb-controlled meals     60 grams/meal     Consuming own meals from home that wife brings (all foods were measured out)    Pain-abdomen As needed pain medications    Infection abdomen      Blood glucose pattern        Assessment and Plan   Nursing Diagnosis Risk for unstable blood glucose pattern   Nursing Intervention Domain 5256 Decision-making Support   Nursing Interventions Examined current inpatient diabetes control   Explored factors facilitating and impeding inpatient management  Identified self-management practices impeding diabetes control  Explored corrective strategies with patient and responsible inpatient provider   Informed patient of rational for insulin strategy while hospitalized  Instructed patient in importance of maintaining his strict diet, continuing to check his BG levels daily, and checking his feet daily. Evaluation   Mr. Saud Simmons, with uncontrolled Type2 diabetes, has achieved inpatient blood glucose target of 100-180mg/dl. BG trends have been within target since admission.  He has required minimal amount of correctional insulin. His A1C has come down since initial diagnosis in 2011 form 14.9% to 10.5% with current admission. Inpatient blood glucose management has been impacted by  [] Kidney dysfunction  [] Erratic meal consumption  [] Glucocorticoid use  [x]  Abdominal infection. Recommendations/Discharge Planning   1. CONTINUE correctional insulin for BG >200mg/dl. 2.  IF BG trends >200mg/dl, consider initiating low dose basal insulin. 3. Discharge Planning-Since A1C 10.5% insulin would be the most appropriate for him to be discharged on, however, he and his wife are insistent to continue with diet and exercise regimen in order to control his diabetes and further decrease his A1C. There is concern for risk of complications from his elevated A1C, but his BG levels have been within target during this hospitalization. Metformin is also an option for adjuvant therapy to assist with reducing his A1C, but unsure if he would want to try that medication again. 4.  Will share my note with his PCP    Referral   [] Behavioral health services  [] Inpatient nutrition services  [] Pharmacy services for medication management  [x] Diabetes Self-Management Training through [facility] (Phone [phone] to schedule appointment)    Billing Code(s)   Thank you for including us in their care. I spent 90 minutes in direct patient care today for this patient.   Time includes chart review, face to face with patient and collaboration with interdisciplinary care team.  KIRSTY Rudolph  [facility]  Access via [address] [place] [address]  [phone] (cell)

## 2020-07-08 NOTE — PROGRESS NOTES
ABRIL:    Reason for Admission:  Abdominal wall abscess                    RUR Score:   9%             Plan for utilizing home health:  Needs home health but [facility] - INPAT[facility] denied    PCP: First and Last name:  Krunal More   [facility] of Practice: [facility]    Are you a current patient: Yes/No: yes   Approximate date of last visit: Patient does not know how long it has been, states it has been a long time   Can you participate in a virtual visit with your PCP: yes                    Current Advanced Directive/Advance Care Plan: not interested in one at this time, is a FULL CODE                         Transition of Care Plan:   Consult received for home health for wound care/teaching and education on diabetes management. CM met with patient and his wife in the room to discuss. Demographics confirmed. Patient confirmed his PCP but stated he had not seen her in quite a while. The surgeon will be signing the home health orders, however. Patient is uninsured, his wife has been instructed on how to apply for the Care Card and will be completing that online today. Cm discussed home health services with patient and wife, explained that it would only be a couple of visits and that the purpose would be to teach them how to do it on their own; they acknowledged understanding. Referral sent to [facility] - INPATIENT, waiting for response. 2:00pm: [facility] INPAT[facility] denied patient, stating they were 'unable to meet patients' needs'. CM notified CM Management. Due to patient being uninsured, there are no other home health agencies that can be utilized. CM will try to come up with another solution. 3:30pm: Cm contacted the [place] outpatient wound care center to see if this would be an option for patient to go to for daily wound care. They stated they cannot do daily wound care.  The process would entail the patient meeting with one of their wound care doctors, who would then teach them how to do the wound care and then following up once a week, if needed. This idea would be futile, as patient and wife could just as easily be taught the wound care here before discharge. However being taught the wound care was not the only need. This CM was also suggested by  to send referrals to other home health agencies to see if any other agencies would consider accepting a self pay patient. Cm will send out more referrals. Care Management Interventions  PCP Verified by CM:  Yes  Palliative Care Criteria Met (RRAT>21 & CHF Dx)?: No  Transition of Care Consult (CM Consult): Home Health  MyChart Signup: No  Discharge Durable Medical Equipment: No  Health Maintenance Reviewed: Yes  Physical Therapy Consult: Yes  Occupational Therapy Consult: Yes  Speech Therapy Consult: No  Current Support Network: Lives with Spouse  Confirm Follow Up Transport: Family  The Patient and/or Patient Representative was Provided with a Choice of Provider and Agrees with the Discharge Plan?: Yes  Freedom of Choice List was Provided with Basic Dialogue that Supports the Patient's Individualized Plan of Care/Goals, Treatment Preferences and Shares the Quality Data Associated with the Providers?: Yes  [employer] Information Provided?: No  Discharge Location  Discharge Placement: Home with home health    Crta. Daniel [address], ACM

## 2020-07-08 NOTE — PROGRESS NOTES
Pharmacist Note - Vancomycin Dosing  Therapy day 3  Indication:  intra-abdominal infection  Current regimen:  1250 mg Q 8hr    A Trough Level resulted at 10.5 mcg/mL which was obtained 8.15 hrs post-dose. The extrapolated \"true\" trough is approximately 11mcg/mL based on the patient's known kinetics. Goal trough: 15 - 20 mcg/mL     Plan: Change to 1500 mg Q 8hr . Pharmacy will continue to monitor this patient daily for changes in clinical status and renal function.

## 2020-07-08 NOTE — PROGRESS NOTES
Bedside and Verbal shift change report given to Geraldine Cuevas RN (oncoming nurse) by Lucia Kelly RN (offgoing nurse). Report included the following information SBAR, Kardex, Intake/Output and MAR.

## 2020-07-08 NOTE — CDMP QUERY
Patient admitted with abdominal wall abscess, noted to have debridement and drainage of abscess on 7/7 OP note. To accurately reflect the procedure performed please addend the note to include:  Deepest depth of tissue removed as down to and including skin, subcutaneous, fascia, muscle, tendon/joint, bone, etc., as appropriate  Excisional debridement = cutting off or away--without replacement--devitalized tissue, necrosis or slough  Non-excisional debridement = brushing, irrigating, scrubbing or washing of devitalized tissue, necrosis or slough Ex. Minor removal of loose fragments, scraping, whirlpool, mechanical or pulsatile lavage, irrigation  Type of instrument used The medical record reflects the following: 
  Risk Factors: abd wall abscess, DM Clinical Indicators: OP Note- Necrotic-appearing skin was sharply debrided back to viable tissue. Necrotic fat was also noted, and this was debrided back to healthy-appearing tissue as well. Treatment: drainage of abd wall abscess Thank you, Evgeny Sheppard RN 
[facility] 
[phone]

## 2020-07-08 NOTE — PROGRESS NOTES
Mr. Eric Alexis has no complaints today. Feeling better. Tm 99.4 Tc 98.2 HR: 65 BP: 142/92 Resp Rate: 17 97% sat on 2 liters/minute. Intake/Output Summary (Last 24 hours) at 7/8/2020 1248  Last data filed at 7/8/2020 0358  Gross per 24 hour   Intake --   Output 1300 ml   Net -1300 ml   Exam: Cor: RRR. Lungs: Bilateral breath sounds. Clear to auscultation. Abd: Soft. Non distended. Tenderness at site of wound. No guarding or rebound. The wound is clean and beginning to granulate. Minimal purulent fluid at base of wound. No enteric appearing drainage. Cellulitis and induration improved. Labs:   Recent Results (from the past 12 hour(s))   CBC WITH AUTOMATED DIFF    Collection Time: 07/08/20  3:33 AM   Result Value Ref Range    WBC 27.3 (H) 4.1 - 11.1 K/uL    RBC 4.45 4.10 - 5.70 M/uL    HGB 11.6 (L) 12.1 - 17.0 g/dL    HCT 37.0 36.6 - 50.3 %    MCV 83.1 80.0 - 99.0 FL    MCH 26.1 26.0 - 34.0 PG    MCHC 31.4 30.0 - 36.5 g/dL    RDW 12.4 11.5 - 14.5 %    PLATELET 522 (H) 952 - 400 K/uL    MPV 10.0 8.9 - 12.9 FL    NRBC 0.0 0  WBC    ABSOLUTE NRBC 0.00 0.00 - 0.01 K/uL    NEUTROPHILS 84 (H) 32 - 75 %    LYMPHOCYTES 9 (L) 12 - 49 %    MONOCYTES 5 5 - 13 %    EOSINOPHILS 1 0 - 7 %    BASOPHILS 0 0 - 1 %    IMMATURE GRANULOCYTES 1 (H) 0.0 - 0.5 %    ABS. NEUTROPHILS 22.8 (H) 1.8 - 8.0 K/UL    ABS. LYMPHOCYTES 2.5 0.8 - 3.5 K/UL    ABS. MONOCYTES 1.4 (H) 0.0 - 1.0 K/UL    ABS. EOSINOPHILS 0.3 0.0 - 0.4 K/UL    ABS. BASOPHILS 0.0 0.0 - 0.1 K/UL    ABS. IMM.  GRANS. 0.3 (H) 0.00 - 0.04 K/UL    DF SMEAR SCANNED      PLATELET COMMENTS Large Platelets      RBC COMMENTS NORMOCYTIC, NORMOCHROMIC     MAGNESIUM    Collection Time: 07/08/20  3:33 AM   Result Value Ref Range    Magnesium 2.3 1.6 - 2.4 mg/dL   METABOLIC PANEL, BASIC    Collection Time: 07/08/20  3:33 AM   Result Value Ref Range    Sodium 134 (L) 136 - 145 mmol/L Potassium 4.1 3.5 - 5.1 mmol/L    Chloride 103 97 - 108 mmol/L    CO2 25 21 - 32 mmol/L    Anion gap 6 5 - 15 mmol/L    Glucose 127 (H) 65 - 100 mg/dL    BUN 14 6 - 20 MG/DL    Creatinine 0.69 (L) 0.70 - 1.30 MG/DL    BUN/Creatinine ratio 20 12 - 20      GFR est AA >60 >60 ml/min/1.73m2    GFR est non-AA >60 >60 ml/min/1.73m2    Calcium 9.0 8.5 - 10.1 MG/DL   PHOSPHORUS    Collection Time: 07/08/20  3:33 AM   Result Value Ref Range    Phosphorus 3.2 2.6 - 4.7 MG/DL   GLUCOSE, POC    Collection Time: 07/08/20  6:04 AM   Result Value Ref Range    Glucose (POC) 130 (H) 65 - 100 mg/dL    Performed by Shad Dwyer, TROUGH    Collection Time: 07/08/20 11:29 AM   Result Value Ref Range    Vancomycin,trough 10.5 (H) 5.0 - 10.0 ug/mL    Reported dose date: NOT PROVIDED      Reported dose time: NOT PROVIDED      Reported dose: NOT PROVIDED UNITS   GLUCOSE, POC    Collection Time: 07/08/20 12:19 PM   Result Value Ref Range    Glucose (POC) 139 (H) 65 - 100 mg/dL    Performed by Max Saenz    Mr. Fly Castillo is doing well following drainage of an abdominal wall abscess. Will start local wound care. Continue IV abx - Vancomycin and Zosyn. Diabetic diet as tolerated. Saline lock IVF. Appreciate input from Diabetes Management. Anti-emetics and pain meds as needed. DVT prophylaxis - SC Heparin. Needs to be OOB. CBC in AM.   Discharge planning.

## 2020-07-09 LAB
BACTERIA SPEC CULT: ABNORMAL
BASOPHILS # BLD: 0.2 K/UL (ref 0–0.1)
BASOPHILS NFR BLD: 1 % (ref 0–1)
DIFFERENTIAL METHOD BLD: ABNORMAL
EOSINOPHIL # BLD: 0.5 K/UL (ref 0–0.4)
EOSINOPHIL NFR BLD: 3 % (ref 0–7)
ERYTHROCYTE [DISTWIDTH] IN BLOOD BY AUTOMATED COUNT: 12.3 % (ref 11.5–14.5)
GLUCOSE BLD STRIP.AUTO-MCNC: 105 MG/DL (ref 65–100)
GLUCOSE BLD STRIP.AUTO-MCNC: 119 MG/DL (ref 65–100)
GLUCOSE BLD STRIP.AUTO-MCNC: 121 MG/DL (ref 65–100)
GLUCOSE BLD STRIP.AUTO-MCNC: 130 MG/DL (ref 65–100)
GRAM STN SPEC: ABNORMAL
HCT VFR BLD AUTO: 36.2 % (ref 36.6–50.3)
HGB BLD-MCNC: 11.3 G/DL (ref 12.1–17)
IMM GRANULOCYTES # BLD AUTO: 0.3 K/UL (ref 0–0.04)
IMM GRANULOCYTES NFR BLD AUTO: 2 % (ref 0–0.5)
LYMPHOCYTES # BLD: 2.6 K/UL (ref 0.8–3.5)
LYMPHOCYTES NFR BLD: 14 % (ref 12–49)
MCH RBC QN AUTO: 26 PG (ref 26–34)
MCHC RBC AUTO-ENTMCNC: 31.2 G/DL (ref 30–36.5)
MCV RBC AUTO: 83.4 FL (ref 80–99)
MONOCYTES # BLD: 1.1 K/UL (ref 0–1)
MONOCYTES NFR BLD: 6 % (ref 5–13)
NEUTS SEG # BLD: 14.1 K/UL (ref 1.8–8)
NEUTS SEG NFR BLD: 74 % (ref 32–75)
NRBC # BLD: 0 K/UL (ref 0–0.01)
NRBC BLD-RTO: 0 PER 100 WBC
PLATELET # BLD AUTO: 566 K/UL (ref 150–400)
PMV BLD AUTO: 10.1 FL (ref 8.9–12.9)
RBC # BLD AUTO: 4.34 M/UL (ref 4.1–5.7)
SERVICE CMNT-IMP: ABNORMAL
WBC # BLD AUTO: 18.7 K/UL (ref 4.1–11.1)

## 2020-07-09 PROCEDURE — 85025 COMPLETE CBC W/AUTO DIFF WBC: CPT

## 2020-07-09 PROCEDURE — 74011000258 HC RX REV CODE- 258: Performed by: SURGERY

## 2020-07-09 PROCEDURE — 74011250637 HC RX REV CODE- 250/637: Performed by: SURGERY

## 2020-07-09 PROCEDURE — 74011250636 HC RX REV CODE- 250/636: Performed by: SURGERY

## 2020-07-09 PROCEDURE — 82962 GLUCOSE BLOOD TEST: CPT

## 2020-07-09 PROCEDURE — 77030040718 HC DRSG HYDRGEL SKINTEGRITY MDII -A

## 2020-07-09 PROCEDURE — 74011250637 HC RX REV CODE- 250/637: Performed by: PHYSICIAN ASSISTANT

## 2020-07-09 PROCEDURE — 36415 COLL VENOUS BLD VENIPUNCTURE: CPT

## 2020-07-09 PROCEDURE — 65270000032 HC RM SEMIPRIVATE

## 2020-07-09 RX ORDER — DOCUSATE SODIUM 100 MG/1
100 CAPSULE, LIQUID FILLED ORAL 2 TIMES DAILY
Status: DISCONTINUED | OUTPATIENT
Start: 2020-07-09 | End: 2020-07-11 | Stop reason: HOSPADM

## 2020-07-09 RX ORDER — ENOXAPARIN SODIUM 100 MG/ML
40 INJECTION SUBCUTANEOUS EVERY 24 HOURS
Status: DISCONTINUED | OUTPATIENT
Start: 2020-07-09 | End: 2020-07-11 | Stop reason: HOSPADM

## 2020-07-09 RX ORDER — LEVOFLOXACIN 750 MG/1
750 TABLET ORAL DAILY
Qty: 10 TAB | Refills: 0 | Status: SHIPPED | OUTPATIENT
Start: 2020-07-09 | End: 2020-07-11 | Stop reason: SDUPTHER

## 2020-07-09 RX ORDER — OXYCODONE AND ACETAMINOPHEN 5; 325 MG/1; MG/1
1 TABLET ORAL
Qty: 30 TAB | Refills: 0 | Status: SHIPPED | OUTPATIENT
Start: 2020-07-09 | End: 2020-07-11

## 2020-07-09 RX ORDER — METRONIDAZOLE 500 MG/1
500 TABLET ORAL 3 TIMES DAILY
Qty: 30 TAB | Refills: 0 | Status: SHIPPED | OUTPATIENT
Start: 2020-07-09 | End: 2020-07-11 | Stop reason: SDUPTHER

## 2020-07-09 RX ORDER — POLYETHYLENE GLYCOL 3350 17 G/17G
17 POWDER, FOR SOLUTION ORAL DAILY
Status: DISCONTINUED | OUTPATIENT
Start: 2020-07-10 | End: 2020-07-11 | Stop reason: HOSPADM

## 2020-07-09 RX ADMIN — Medication 10 ML: at 06:00

## 2020-07-09 RX ADMIN — ACETAMINOPHEN 1000 MG: 500 TABLET ORAL at 11:23

## 2020-07-09 RX ADMIN — DOCUSATE SODIUM 100 MG: 100 CAPSULE, LIQUID FILLED ORAL at 17:26

## 2020-07-09 RX ADMIN — PIPERACILLIN AND TAZOBACTAM 3.38 G: 3; .375 INJECTION, POWDER, LYOPHILIZED, FOR SOLUTION INTRAVENOUS at 17:26

## 2020-07-09 RX ADMIN — OXYCODONE 10 MG: 5 TABLET ORAL at 23:58

## 2020-07-09 RX ADMIN — Medication 10 ML: at 14:00

## 2020-07-09 RX ADMIN — DOCUSATE SODIUM 100 MG: 100 CAPSULE, LIQUID FILLED ORAL at 12:15

## 2020-07-09 RX ADMIN — PIPERACILLIN AND TAZOBACTAM 3.38 G: 3; .375 INJECTION, POWDER, LYOPHILIZED, FOR SOLUTION INTRAVENOUS at 02:00

## 2020-07-09 RX ADMIN — MORPHINE SULFATE 2 MG: 2 INJECTION, SOLUTION INTRAMUSCULAR; INTRAVENOUS at 09:17

## 2020-07-09 RX ADMIN — PIPERACILLIN AND TAZOBACTAM 3.38 G: 3; .375 INJECTION, POWDER, LYOPHILIZED, FOR SOLUTION INTRAVENOUS at 09:17

## 2020-07-09 RX ADMIN — VANCOMYCIN HYDROCHLORIDE 1500 MG: 10 INJECTION, POWDER, LYOPHILIZED, FOR SOLUTION INTRAVENOUS at 18:21

## 2020-07-09 RX ADMIN — ENOXAPARIN SODIUM 40 MG: 40 INJECTION SUBCUTANEOUS at 12:29

## 2020-07-09 RX ADMIN — VANCOMYCIN HYDROCHLORIDE 1500 MG: 10 INJECTION, POWDER, LYOPHILIZED, FOR SOLUTION INTRAVENOUS at 03:00

## 2020-07-09 RX ADMIN — VANCOMYCIN HYDROCHLORIDE 1500 MG: 10 INJECTION, POWDER, LYOPHILIZED, FOR SOLUTION INTRAVENOUS at 11:08

## 2020-07-09 RX ADMIN — OXYCODONE 10 MG: 5 TABLET ORAL at 19:37

## 2020-07-09 NOTE — DIABETES MGMT
NEERU DICKINSON  CLINICAL NURSE SPECIALIST CONSULT  [facility]    INITIAL NOTE    Presentation   Alicia Ortega. is a 30 y.o. male admitted with abdominal wall abscess, s/p abdominal wall abscess drainage. He is s/p appendectomy on 6/25/20 and reported to ED with abdominal pain. PMH: DM2- A1C 10.5%  Current clinical course has been complicated by infection from abdomen. Findings to date include: elevated WBC: 27.3      Diabetes: Patient has known Type 2 diabetes-A1C 10.5%, treated with no diabetes PTA. Family history positive for diabetes. Consulted by Provider for advanced diabetes nursing assessment and care, specifically related to   [] Transitioning off Conrado Days   [x] Inpatient management strategy  [x] Home management assessment  [] Survival skill education    Diabetes-related medical history  Acute complications  NONE  Neurological complications  Peripheral neuropathy  Microvascular disease  none  Macrovascular disease  none  Other associated conditions     none    Diabetes medication history: NONE  Drug class Currently in use Discontinued Never used   Biguanide      DDP-4 inhibitor       Sulfonylurea      Thiazolidinedione      GLP-1 RA      SGLT-2 inhibitors      Basal insulin      Fixed Dose  Combinations      Bolus insulin        Subjective   I visited  with Mr. Ramonita Warren and wife, [place], today. He states he's feeling better today. Jessi fabian has been bringing in his meals from home in measured . She stated he at 1/2 of a veggie wrap yesterday and is going to eat a veggie bowl from [address] [place] [address] for lunch. He stated he ambulated in the halls twice yesterday and will ambulate in the cates after he eats lunch. Wife seems to control all his eating and personal care while he's been here. He is very dependant on her -    Patient reports the following home diabetes self-care practices:  Eating pattern-see above, he only drinks water and the teas.       Physical activity pattern-see above    Monitoring pattern-I couldn't get a straight answer on how often he checks his BG-his wife states she checks it in the AM before he eats. Taking medications pattern-n/a  [] Consistent administration  [] Affordable    Social determinants of health impacting diabetes self-management practices   Concerned that you need to know more about how to stay healthy with diabetes    Objective   Physical exam  General Alert, oriented and in no acute distress/ill-appearing. Conversant and cooperative. Vital Signs   Visit Vitals  /88   Pulse (!) 56   Temp 98.3 °F (36.8 °C)   Resp 16   Ht 6' 2\" (1.88 m)   Wt 97.5 kg (214 lb 15.2 oz)   SpO2 99%   BMI 27.60 kg/m²     Skin  Warm and dry. Heart   Regular rate and rhythm. No murmurs, rubs or gallops  Lungs  Clear to auscultation without rales or rhonchi  Extremities No foot wounds    Diabetic foot exam:    Left Foot     Visual Exam: normal    Pulse DP: 2+ (normal)   Filament test: reduced sensation  ; middle of bottom of foot     Right Foot   Visual Exam: normal    Pulse DP: 2+ (normal)   Filament test: normal sensation     DP & PT pulses +2. Laboratory  Lab Results   Component Value Date/Time    Hemoglobin A1c 10.5 (H) 07/07/2020 03:45 AM    Hemoglobin A1c (POC) 14 07/16/2013 03:03 PM     No results found for: LDL, LDLC, DLDLP  Lab Results   Component Value Date/Time    Creatinine 0.69 (L) 07/08/2020 03:33 AM     Lab Results   Component Value Date/Time    Sodium 134 (L) 07/08/2020 03:33 AM    Potassium 4.1 07/08/2020 03:33 AM    Chloride 103 07/08/2020 03:33 AM    CO2 25 07/08/2020 03:33 AM    Anion gap 6 07/08/2020 03:33 AM    Glucose 127 (H) 07/08/2020 03:33 AM    BUN 14 07/08/2020 03:33 AM    Creatinine 0.69 (L) 07/08/2020 03:33 AM    BUN/Creatinine ratio 20 07/08/2020 03:33 AM    GFR est AA >60 07/08/2020 03:33 AM    GFR est non-AA >60 07/08/2020 03:33 AM    Calcium 9.0 07/08/2020 03:33 AM    Bilirubin, total 0.5 07/06/2020 10:52 PM    Alk.  phosphatase 118 (H) 07/06/2020 10:52 PM    Protein, total 9.3 (H) 07/06/2020 10:52 PM    Albumin 2.7 (L) 07/06/2020 10:52 PM    Globulin 6.6 (H) 07/06/2020 10:52 PM    A-G Ratio 0.4 (L) 07/06/2020 10:52 PM    ALT (SGPT) 21 07/06/2020 10:52 PM     Lab Results   Component Value Date/Time    ALT (SGPT) 21 07/06/2020 10:52 PM       Factors affecting BG pattern  Factor Dose Comments   Nutrition:  Carb-controlled meals     60 grams/meal     Consuming own meals from home that wife brings (all foods were measured out)    Pain-abdomen As needed pain medications    Infection abdomen      Blood glucose pattern        Assessment and Plan   Nursing Diagnosis Risk for unstable blood glucose pattern   Nursing Intervention Domain 525 Decision-making Support   Nursing Interventions Examined current inpatient diabetes control   Explored factors facilitating and impeding inpatient management  Identified self-management practices impeding diabetes control  Explored corrective strategies with patient and responsible inpatient provider   Informed patient of rational for insulin strategy while hospitalized  Instructed patient in importance of maintaining his strict diet, continuing to check his BG levels daily, and checking his feet daily. Evaluation   Mr. Candis Augustin, with uncontrolled Type2 diabetes, has achieved inpatient blood glucose target of 100-180mg/dl. BG trends have been within target since admission. He has required minimal amount of correctional insulin. Wife bringing in his own food in measured out containers. His A1C has come down since initial diagnosis in 2011 form 14.9% to 10.5% with current admission. Inpatient blood glucose management has been impacted by  [] Kidney dysfunction  [] Erratic meal consumption  [] Glucocorticoid use  [x]  Abdominal infection. Recommendations/Discharge Planning   1. CONTINUE correctional insulin for BG >200mg/dl. 2.  IF BG trends >200mg/dl, consider initiating low dose basal insulin.     3. Discharge Planning-Since A1C 10.5% insulin would be the most appropriate for him to be discharged on, however, he and his wife are insistent to continue with diet and exercise regimen in order to control his diabetes and further decrease his A1C. There is concern for risk of complications from his elevated A1C, but his BG levels have been within target during this hospitalization. Metformin is also an option for adjuvant therapy to assist with reducing his A1C, but unsure if he would want to try that medication again. 4.  Will instruct wife to continue to check BG daily    5. Follow up with PCP for continued diabetes care. 6. Order put in for outpatient diabetes education-. This will trigger a referral for the [facility] which includes outpatient education with a certified diabetes educator. Referral   [] Behavioral health services  [] Inpatient nutrition services  [] Pharmacy services for medication management  [x] Diabetes Self-Management Training through [facility] (Phone [phone] to schedule appointment)    Billing Code(s)   Thank you for including us in their care. I spent 20 minutes in direct patient care today for this patient.   Time includes chart review, face to face with patient and collaboration with interdisciplinary care team.  Bhaskar Mehta, [facility]  [facility]  Access via [facility]  [phone] (cell)

## 2020-07-09 NOTE — PROGRESS NOTES
Bedside and Verbal shift change report given to Huber Hamlin RN (oncoming nurse) by Willian Siemens, RN (offgoing nurse). Report included the following information SBAR, Kardex, MAR and Recent Results.

## 2020-07-09 NOTE — WOUND CARE
Wound Consult:  Consulted for wound care teaching with patient and his wife Sade Solorzano. Met with both; most pleasant gentleman who had surgery with subsequent abscess at site and was admitted for same. His dressing has been changed twice today already so I am asked to change tomorrow with them - discussed with patient's nurse Kwame Coon and will coordinate with them tomorrow around pain medication.   Lanie Proctor, [facility]  Office [phone]  Pager # [phone]

## 2020-07-09 NOTE — PROGRESS NOTES
Bedside and Verbal shift change report given to Caty Ayala RN (oncoming nurse) by Melania Mcgowan RN (offgoing nurse). Report included the following information SBAR, Kardex, Intake/Output and MAR.

## 2020-07-09 NOTE — PROGRESS NOTES
Surgery Progress Note    Admit Date: 7/6/2020      Subjective:      Pt complains of feeling nervous about eating and having bowel movements because of his open wound and feels like he may worsen it, ,no acute issues overnight, pain has been controlled and he is pre medicating for wound care, which was just done by nursing staff. Pts pain present - adequately treated. No SOB. No CP. Pt is ambulating. Patient 's current diet is Regular, pt is a vegitarian . Ramón Flores Pt reports  no nausea and no vomiting. Pt reports no fever or chills. Pt is asking if he may discontinue taking heparin as he is ambulating TID     Bowel Movements: None and he has not had a bowel movement in 4-5 days and is concerned about straining and affecting his wound         Objective:     Patient Vitals for the past 8 hrs:   BP Temp Pulse Resp SpO2   07/09/20 0906 162/88 98.3 °F (36.8 °C) (!) 56 16 99 %     No intake/output data recorded. 07/07 1901 - 07/09 0700  In: -   Out: 1680 [Urine:1680]ip  Physical Exam:    General: alert, cooperative, no distress  Cardiac: normal S1 and S2  Lungs: Normal chest wall and respirations. Clear to auscultation.   Abdomen: soft, protuberant, nondistended, normal bowel sounds  Wounds:large clean dressing intact   Neuro: alert, oriented x 3, no defects noted in general exam.  Extremities: no edema      CBC:   Lab Results   Component Value Date/Time    WBC 18.7 (H) 07/09/2020 02:59 AM    RBC 4.34 07/09/2020 02:59 AM    HGB 11.3 (L) 07/09/2020 02:59 AM    HCT 36.2 (L) 07/09/2020 02:59 AM    PLATELET 815 (H) 34/41/3645 02:59 AM     BMP:   Lab Results   Component Value Date/Time    Glucose 127 (H) 07/08/2020 03:33 AM    Sodium 134 (L) 07/08/2020 03:33 AM    Potassium 4.1 07/08/2020 03:33 AM    Chloride 103 07/08/2020 03:33 AM    CO2 25 07/08/2020 03:33 AM    BUN 14 07/08/2020 03:33 AM    Creatinine 0.69 (L) 07/08/2020 03:33 AM    Calcium 9.0 07/08/2020 03:33 AM     CMP:  Lab Results   Component Value Date/Time    Glucose 127 (H) 07/08/2020 03:33 AM    Sodium 134 (L) 07/08/2020 03:33 AM    Potassium 4.1 07/08/2020 03:33 AM    Chloride 103 07/08/2020 03:33 AM    CO2 25 07/08/2020 03:33 AM    BUN 14 07/08/2020 03:33 AM    Creatinine 0.69 (L) 07/08/2020 03:33 AM    Calcium 9.0 07/08/2020 03:33 AM    Anion gap 6 07/08/2020 03:33 AM    BUN/Creatinine ratio 20 07/08/2020 03:33 AM    Alk. phosphatase 118 (H) 07/06/2020 10:52 PM    Protein, total 9.3 (H) 07/06/2020 10:52 PM    Albumin 2.7 (L) 07/06/2020 10:52 PM    Globulin 6.6 (H) 07/06/2020 10:52 PM    A-G Ratio 0.4 (L) 07/06/2020 10:52 PM       Radiology review: na        Assessment:   Pt is POD #2 s/p Procedure(s):  DRAINAGE ABDOMINAL WALL  ABCESS    Patient Active Problem List   Diagnosis Code    Type II or unspecified type diabetes mellitus without mention of complication, uncontrolled [id number]         S/P laparoscopic appendectomy Z90.49    Abscess L02.91     Plan:   Diet: regular DM diet, FS have been good, ok to start gentle bowel regimen   Activity: walk in cates  Pain management   DVT prophylaxis--pt is asking to DC taking the heparin--defer to Dr. Maverick Davison: WBC trending down, -continue current tx until his WBC has normalized   Antibiotics: vancomycin and Zosyn  DM management   Continue daily wound care, wound care nurse as requested a consult so that they may do some  teaching, he does not qualify for [facility] but will begin care at the [facility] wound care clinic on [facility] starting Monday 7/13 and his wife says she is comfortable learning to do the packing until then and as needed. Further plan per Dr. Sandee Hernandez, covering surgeon   Stella Jimenez PA-C      Pt seen and examined   agree with above. Still complains of pain in the LLQ. Seems to be improving  Wound examined- There is still some drainage deeply but overall it seems to have some granulation tissue beginning. Switch heparin to Lovenox for once a day dosing. Continue IV abx  Await speciation  Continue dressing changes.    HA1C improving, but will still have DM management see.

## 2020-07-09 NOTE — PROGRESS NOTES
ABRIL:    Appointment made for patient at the [address] [place] [address] on 7/13/2020 at 8:15am.  AVS updated. CM discussed with patient and his wife and they are in agreement that they can manage the wound at home with the support of the [address] [place] [address] and follow up appointments with Dr. Tessy Dela Cruz. Consult place for wound care to do teaching before discharge.     Charlie Dawson RN/CRM

## 2020-07-09 NOTE — PROGRESS NOTES
CDMP Query:   Deepest depth of tissue removed - skin and subcutaneous fat. Excisional debridement with scalpel.    Non excisional debridement with Pulsavac

## 2020-07-09 NOTE — CDMP QUERY
Pt admitted with abdominal wall abscess, noted to have DM and be s/p recent appendectomy. If possible, please document in progress notes and discharge summary the relationship, if any, between abdominal wall abscess and DM and/or abdominal wall abscess and recent surgery.  Abdominal wall abscess due to diabetes  Abdominal wall abscess unrelated to diabetes  Abdominal wall abscess as a post-operative complication  Abdominal wall abscess as an expected/inherent condition that occurred post-operatively and not a complication  Abdominal wall abscess related to other underlying condition or incidental risk factor (please specify) occurring after surgery and not a complication  Other  Clinically unable to determine The medical record reflects the following: 
  Risk Factors: recent appendectomy, DM Clinical Indicators: POC glucose 111-160  Hemoglobin A1c, (calculated): 10.5 / Est. average glucose: 255  H&P- s/p laparoscopic appendectomy, with an abdominal wall abscess. Treatment: abd CT, IV Zosyn and Vanc, Diabetes Health consult, POC glucose, abd wall abscess drainage and debridement, monitoring Thank you, Lydia Cam RN 
[facility] 
[phone]

## 2020-07-10 ENCOUNTER — TELEPHONE (OUTPATIENT)
Dept: WOUND CARE | Age: 35
End: 2020-07-10

## 2020-07-10 LAB
ANION GAP SERPL CALC-SCNC: 8 MMOL/L (ref 5–15)
BACTERIA SPEC CULT: ABNORMAL
BACTERIA SPEC CULT: ABNORMAL
BASOPHILS # BLD: 0.1 K/UL (ref 0–0.1)
BASOPHILS NFR BLD: 1 % (ref 0–1)
BUN SERPL-MCNC: 10 MG/DL (ref 6–20)
BUN/CREAT SERPL: 14 (ref 12–20)
CALCIUM SERPL-MCNC: 8.9 MG/DL (ref 8.5–10.1)
CHLORIDE SERPL-SCNC: 100 MMOL/L (ref 97–108)
CO2 SERPL-SCNC: 25 MMOL/L (ref 21–32)
CREAT SERPL-MCNC: 0.74 MG/DL (ref 0.7–1.3)
DIFFERENTIAL METHOD BLD: ABNORMAL
EOSINOPHIL # BLD: 0.5 K/UL (ref 0–0.4)
EOSINOPHIL NFR BLD: 4 % (ref 0–7)
ERYTHROCYTE [DISTWIDTH] IN BLOOD BY AUTOMATED COUNT: 12.1 % (ref 11.5–14.5)
GLUCOSE BLD STRIP.AUTO-MCNC: 117 MG/DL (ref 65–100)
GLUCOSE BLD STRIP.AUTO-MCNC: 121 MG/DL (ref 65–100)
GLUCOSE BLD STRIP.AUTO-MCNC: 136 MG/DL (ref 65–100)
GLUCOSE BLD STRIP.AUTO-MCNC: 182 MG/DL (ref 65–100)
GLUCOSE SERPL-MCNC: 101 MG/DL (ref 65–100)
HCT VFR BLD AUTO: 40 % (ref 36.6–50.3)
HGB BLD-MCNC: 12.5 G/DL (ref 12.1–17)
IMM GRANULOCYTES # BLD AUTO: 0.2 K/UL (ref 0–0.04)
IMM GRANULOCYTES NFR BLD AUTO: 2 % (ref 0–0.5)
LYMPHOCYTES # BLD: 2.5 K/UL (ref 0.8–3.5)
LYMPHOCYTES NFR BLD: 18 % (ref 12–49)
MCH RBC QN AUTO: 25.8 PG (ref 26–34)
MCHC RBC AUTO-ENTMCNC: 31.3 G/DL (ref 30–36.5)
MCV RBC AUTO: 82.6 FL (ref 80–99)
MONOCYTES # BLD: 0.7 K/UL (ref 0–1)
MONOCYTES NFR BLD: 6 % (ref 5–13)
NEUTS SEG # BLD: 9.5 K/UL (ref 1.8–8)
NEUTS SEG NFR BLD: 69 % (ref 32–75)
NRBC # BLD: 0 K/UL (ref 0–0.01)
NRBC BLD-RTO: 0 PER 100 WBC
PLATELET # BLD AUTO: 594 K/UL (ref 150–400)
PMV BLD AUTO: 10 FL (ref 8.9–12.9)
POTASSIUM SERPL-SCNC: 3.5 MMOL/L (ref 3.5–5.1)
RBC # BLD AUTO: 4.84 M/UL (ref 4.1–5.7)
SERVICE CMNT-IMP: ABNORMAL
SODIUM SERPL-SCNC: 133 MMOL/L (ref 136–145)
WBC # BLD AUTO: 13.5 K/UL (ref 4.1–11.1)

## 2020-07-10 PROCEDURE — 74011250636 HC RX REV CODE- 250/636: Performed by: SURGERY

## 2020-07-10 PROCEDURE — 74011250637 HC RX REV CODE- 250/637: Performed by: SURGERY

## 2020-07-10 PROCEDURE — 36415 COLL VENOUS BLD VENIPUNCTURE: CPT

## 2020-07-10 PROCEDURE — 80048 BASIC METABOLIC PNL TOTAL CA: CPT

## 2020-07-10 PROCEDURE — 74011000258 HC RX REV CODE- 258: Performed by: SURGERY

## 2020-07-10 PROCEDURE — 85025 COMPLETE CBC W/AUTO DIFF WBC: CPT

## 2020-07-10 PROCEDURE — 82962 GLUCOSE BLOOD TEST: CPT

## 2020-07-10 PROCEDURE — 74011250637 HC RX REV CODE- 250/637: Performed by: PHYSICIAN ASSISTANT

## 2020-07-10 PROCEDURE — 74011636637 HC RX REV CODE- 636/637: Performed by: SURGERY

## 2020-07-10 PROCEDURE — 65270000032 HC RM SEMIPRIVATE

## 2020-07-10 RX ADMIN — ENOXAPARIN SODIUM 40 MG: 40 INJECTION SUBCUTANEOUS at 12:40

## 2020-07-10 RX ADMIN — PIPERACILLIN AND TAZOBACTAM 3.38 G: 3; .375 INJECTION, POWDER, LYOPHILIZED, FOR SOLUTION INTRAVENOUS at 01:56

## 2020-07-10 RX ADMIN — POLYETHYLENE GLYCOL 3350 17 G: 17 POWDER, FOR SOLUTION ORAL at 08:17

## 2020-07-10 RX ADMIN — OXYCODONE 10 MG: 5 TABLET ORAL at 23:52

## 2020-07-10 RX ADMIN — PIPERACILLIN AND TAZOBACTAM 3.38 G: 3; .375 INJECTION, POWDER, LYOPHILIZED, FOR SOLUTION INTRAVENOUS at 10:00

## 2020-07-10 RX ADMIN — ONDANSETRON 4 MG: 4 TABLET, ORALLY DISINTEGRATING ORAL at 08:17

## 2020-07-10 RX ADMIN — VANCOMYCIN HYDROCHLORIDE 1500 MG: 10 INJECTION, POWDER, LYOPHILIZED, FOR SOLUTION INTRAVENOUS at 03:15

## 2020-07-10 RX ADMIN — OXYCODONE 10 MG: 5 TABLET ORAL at 19:34

## 2020-07-10 RX ADMIN — Medication 10 ML: at 14:00

## 2020-07-10 RX ADMIN — ACETAMINOPHEN 1000 MG: 500 TABLET ORAL at 00:52

## 2020-07-10 RX ADMIN — MORPHINE SULFATE 2 MG: 2 INJECTION, SOLUTION INTRAMUSCULAR; INTRAVENOUS at 08:18

## 2020-07-10 RX ADMIN — DOCUSATE SODIUM 100 MG: 100 CAPSULE, LIQUID FILLED ORAL at 17:51

## 2020-07-10 RX ADMIN — OXYCODONE 10 MG: 5 TABLET ORAL at 10:00

## 2020-07-10 RX ADMIN — ACETAMINOPHEN 1000 MG: 500 TABLET ORAL at 22:31

## 2020-07-10 RX ADMIN — INSULIN LISPRO 2 UNITS: 100 INJECTION, SOLUTION INTRAVENOUS; SUBCUTANEOUS at 12:39

## 2020-07-10 RX ADMIN — DOCUSATE SODIUM 100 MG: 100 CAPSULE, LIQUID FILLED ORAL at 08:17

## 2020-07-10 RX ADMIN — PIPERACILLIN AND TAZOBACTAM 3.38 G: 3; .375 INJECTION, POWDER, LYOPHILIZED, FOR SOLUTION INTRAVENOUS at 17:51

## 2020-07-10 NOTE — PROGRESS NOTES
Bedside shift change report given to Olvin Dallas (oncoming nurse) by Vicky Palma RN (offgoing nurse). Report included the following information SBAR, Kardex, Intake/Output, MAR and Recent Results.

## 2020-07-10 NOTE — PROGRESS NOTES
Mr. Saud Simmons has no complaints today. Tm 98.9 Tc 98.5 HR: 65 BP: 158/89 Resp Rate: 16 98% sat on room air. Intake/Output Summary (Last 24 hours) at 7/10/2020 0943  Last data filed at 7/10/2020 0730  Gross per 24 hour   Intake --   Output 1150 ml   Net -1150 ml   Exam: Cor: RRR. Lungs: Bilateral breath sounds. Clear to auscultation. Abd: Soft. Non tender. No guarding or rebound. Wound is clean and granulating. Cellulitis and induration resolved. Labs:   Recent Results (from the past 12 hour(s))   GLUCOSE, POC    Collection Time: 07/09/20 11:26 PM   Result Value Ref Range    Glucose (POC) 105 (H) 65 - 100 mg/dL    Performed by TREVOR CEBALLOS JR. [facility]    METABOLIC PANEL, BASIC    Collection Time: 07/10/20  2:11 AM   Result Value Ref Range    Sodium 133 (L) 136 - 145 mmol/L    Potassium 3.5 3.5 - 5.1 mmol/L    Chloride 100 97 - 108 mmol/L    CO2 25 21 - 32 mmol/L    Anion gap 8 5 - 15 mmol/L    Glucose 101 (H) 65 - 100 mg/dL    BUN 10 6 - 20 MG/DL    Creatinine 0.74 0.70 - 1.30 MG/DL    BUN/Creatinine ratio 14 12 - 20      GFR est AA >60 >60 ml/min/1.73m2    GFR est non-AA >60 >60 ml/min/1.73m2    Calcium 8.9 8.5 - 10.1 MG/DL   CBC WITH AUTOMATED DIFF    Collection Time: 07/10/20  2:11 AM   Result Value Ref Range    WBC 13.5 (H) 4.1 - 11.1 K/uL    RBC 4.84 4.10 - 5.70 M/uL    HGB 12.5 12.1 - 17.0 g/dL    HCT 40.0 36.6 - 50.3 %    MCV 82.6 80.0 - 99.0 FL    MCH 25.8 (L) 26.0 - 34.0 PG    MCHC 31.3 30.0 - 36.5 g/dL    RDW 12.1 11.5 - 14.5 %    PLATELET 271 (H) 852 - 400 K/uL    MPV 10.0 8.9 - 12.9 FL    NRBC 0.0 0  WBC    ABSOLUTE NRBC 0.00 0.00 - 0.01 K/uL    NEUTROPHILS 69 32 - 75 %    LYMPHOCYTES 18 12 - 49 %    MONOCYTES 6 5 - 13 %    EOSINOPHILS 4 0 - 7 %    BASOPHILS 1 0 - 1 %    IMMATURE GRANULOCYTES 2 (H) 0.0 - 0.5 %    ABS. NEUTROPHILS 9.5 (H) 1.8 - 8.0 K/UL    ABS. LYMPHOCYTES 2.5 0.8 - 3.5 K/UL    ABS. MONOCYTES 0.7 0.0 - 1.0 K/UL    ABS. EOSINOPHILS 0.5 (H) 0.0 - 0.4 K/UL    ABS. BASOPHILS 0.1 0.0 - 0.1 K/UL    ABS. IMM. GRANS. 0.2 (H) 0.00 - 0.04 K/UL    DF AUTOMATED     GLUCOSE, POC    Collection Time: 07/10/20  5:10 AM   Result Value Ref Range    Glucose (POC) 121 (H) 65 - 100 mg/dL    Performed by Patricia Whitten  PCT    Mr. Arianne Holcomb is doing well following drainage of an abdominal wall abscess. Continue local wound care. IV abx - Zosyn. Will stop Vancomycin. Diabetic diet as tolerated. OOB, Ambulate. Pain medication and anti-emetics as needed. CBC in AM.  Discharge planning in progress.

## 2020-07-10 NOTE — TELEPHONE ENCOUNTER
New Patient Appointment Reminder and COVID-23 Screening     Have you or anyone in your house hold been tested for or have had confirmed positive Covid-19 test or suspected of or have you been around anyone that has had confirmed or suspected Covid-19? No     Does Patient have fever and/or lower respiratory symptoms? (shortness of breath, difficulty breathing, cough) If yes continue with travel screening questions and cancel patient appointment, and refer to to PCP or ED. No     Referred to PCP or to the closest ED and notified ED of pending patient arrival:  No   Open travel questions and document patient responses. If No stop here and give patient reminder time for appointment and ask them please limit to having only 1 person accompany to appointment if necessary, no children allowed at visits. Remind all patients and caretakers they must wear a mask when entering into the wound clinic. Reminder Appointment time given: Yes  Proceedlocal health department and infection prevention immediately. 4. Obtain exposure list to include staff, others in waiting room and transport staff and  submit to infection prevention. Communication to the patient:   Due to your recent history and reported symptoms, we ask that you go to an emergency  department for further evaluation.  You should wear a facemask when you are in the same room with other people and  when you visit a health care provider. If you cannot wear a facemask, other practical  means of protection can be used, such as a wash cloth.  Cover your mouth and nose with a tissue when you cough or sneeze, or you can  cough or sneeze into your sleeve. Throw used tissues away, and immediately wash  your hands with soap and water for at least 20 seconds.  Wash your hands often and thoroughly with soap and water for at least 20 seconds. You can use an alcohol-based hand , if soap and water are not available.

## 2020-07-10 NOTE — DIABETES MGMT
NEERU DICKINSON  CLINICAL NURSE SPECIALIST CONSULT  [facility]    FOLLOW UP NOTE    Presentation   Huey Forde is a 30 y.o. male admitted with abdominal wall abscess, s/p abdominal wall abscess drainage. He is s/p appendectomy on 6/25/20 and reported to ED with abdominal pain. PMH: DM2- A1C 10.5%  Current clinical course has been complicated by infection from abdomen. Findings to date include: elevated WBC: 27.3      Diabetes: Patient has known Type 2 diabetes-A1C 10.5%, treated with no diabetes PTA. Family history positive for diabetes. Consulted by Provider for advanced diabetes nursing assessment and care, specifically related to   [] Transitioning off Skippy Reamer   [x] Inpatient management strategy  [x] Home management assessment  [] Survival skill education    Diabetes-related medical history  Acute complications  NONE  Neurological complications  Peripheral neuropathy  Microvascular disease  none  Macrovascular disease  none  Other associated conditions     none    Diabetes medication history: NONE  Drug class Currently in use Discontinued Never used   Biguanide      DDP-4 inhibitor       Sulfonylurea      Thiazolidinedione      GLP-1 RA      SGLT-2 inhibitors      Basal insulin      Fixed Dose  Combinations      Bolus insulin        Subjective   I visited  with Mr. Mishel Zamarripa and wife, Vira Lackey this morning. I stressed the importance of seeing his PCP every three months to ensure his A1C is continuing to trend down. I also reviewed the s/s of high and low blood sugars (written education in AVS too). I also started the conversation surrounding continuous glucose monitoring and to discuss this with his PCP as an option. Wife seems to control all his eating and personal care while he's been here. He is very dependant on her -    Patient reports the following home diabetes self-care practices:  Eating pattern-see above, he only drinks water and the teas.       Physical activity pattern-see above    Monitoring pattern-I couldn't get a straight answer on how often he checks his BG-his wife states she checks it in the AM before he eats. Taking medications pattern-n/a  [] Consistent administration  [] Affordable    Social determinants of health impacting diabetes self-management practices   Concerned that you need to know more about how to stay healthy with diabetes    Objective   Physical exam  General Alert, oriented and in no acute distress/ill-appearing. Conversant and cooperative. Vital Signs   Visit Vitals  /89   Pulse 65   Temp 98.5 °F (36.9 °C)   Resp 16   Ht 6' 2\" (1.88 m)   Wt 97.5 kg (214 lb 15.2 oz)   SpO2 98%   BMI 27.60 kg/m²     Skin  Warm and dry. Heart   Regular rate and rhythm. No murmurs, rubs or gallops  Lungs  Clear to auscultation without rales or rhonchi  Extremities No foot wounds    Diabetic foot exam:    Left Foot     Visual Exam: normal    Pulse DP: 2+ (normal)   Filament test: reduced sensation  ; middle of bottom of foot     Right Foot   Visual Exam: normal    Pulse DP: 2+ (normal)   Filament test: normal sensation     DP & PT pulses +2. Laboratory  Lab Results   Component Value Date/Time    Hemoglobin A1c 10.5 (H) 07/07/2020 03:45 AM    Hemoglobin A1c (POC) 14 07/16/2013 03:03 PM     No results found for: LDL, LDLC, DLDLP  Lab Results   Component Value Date/Time    Creatinine 0.74 07/10/2020 02:11 AM     Lab Results   Component Value Date/Time    Sodium 133 (L) 07/10/2020 02:11 AM    Potassium 3.5 07/10/2020 02:11 AM    Chloride 100 07/10/2020 02:11 AM    CO2 25 07/10/2020 02:11 AM    Anion gap 8 07/10/2020 02:11 AM    Glucose 101 (H) 07/10/2020 02:11 AM    BUN 10 07/10/2020 02:11 AM    Creatinine 0.74 07/10/2020 02:11 AM    BUN/Creatinine ratio 14 07/10/2020 02:11 AM    GFR est AA >60 07/10/2020 02:11 AM    GFR est non-AA >60 07/10/2020 02:11 AM    Calcium 8.9 07/10/2020 02:11 AM    Bilirubin, total 0.5 07/06/2020 10:52 PM    Alk.  phosphatase 118 (H) 07/06/2020 10:52 PM    Protein, total 9.3 (H) 07/06/2020 10:52 PM    Albumin 2.7 (L) 07/06/2020 10:52 PM    Globulin 6.6 (H) 07/06/2020 10:52 PM    A-G Ratio 0.4 (L) 07/06/2020 10:52 PM    ALT (SGPT) 21 07/06/2020 10:52 PM     Lab Results   Component Value Date/Time    ALT (SGPT) 21 07/06/2020 10:52 PM       Factors affecting BG pattern  Factor Dose Comments   Nutrition:  Carb-controlled meals     60 grams/meal     Consuming own meals from home that wife brings (all foods were measured out)    Pain-abdomen As needed pain medications    Infection abdomen      Blood glucose pattern      Assessment and Plan   Nursing Diagnosis Risk for unstable blood glucose pattern   Nursing Intervention Domain 5252 Decision-making Support   Nursing Interventions Examined current inpatient diabetes control   Explored factors facilitating and impeding inpatient management  Identified self-management practices impeding diabetes control  Explored corrective strategies with patient and responsible inpatient provider   Informed patient of rational for insulin strategy while hospitalized  Instructed patient in importance of maintaining his strict diet, continuing to check his BG levels daily, and checking his feet daily. Evaluation   Mr. Flaco Ojeda, with uncontrolled Type2 diabetes, has achieved inpatient blood glucose target of 100-180mg/dl. BG trends have been within target since admission. He has required minimal amount of correctional insulin. Wife bringing in his own food in measured out containers. His A1C has come down since initial diagnosis in 2011 form 14.9% to 10.5% with current admission. Inpatient blood glucose management has been impacted by  [] Kidney dysfunction  [] Erratic meal consumption  [] Glucocorticoid use  [x]  Abdominal infection. Recommendations/Discharge Planning   1. CONTINUE correctional insulin for BG >200mg/dl. 2.  IF BG trends >200mg/dl, consider initiating low dose basal insulin.     3. Discharge Planning-Since A1C 10.5% insulin would be the most appropriate for him to be discharged on, however, he and his wife are insistent to continue with diet and exercise regimen in order to control his diabetes and further decrease his A1C. There is concern for risk of complications from his elevated A1C, but his BG levels have been within target during this hospitalization. Metformin is also an option for adjuvant therapy to assist with reducing his A1C, but unsure if he would want to try that medication again. 4.  Will instruct wife to continue to check BG daily and log results    5. Follow up with PCP for continued diabetes care. 6. Order put in for outpatient diabetes education-. This will trigger a referral for the [facility] which includes outpatient education with a certified diabetes educator. Referral   [] Behavioral health services  [] Inpatient nutrition services  [] Pharmacy services for medication management  [x] Diabetes Self-Management Training through [facility] (Phone [phone] to schedule appointment)    Billing Code(s)   Thank you for including us in their care. I spent 20 minutes in direct patient care today for this patient.   Time includes chart review, face to face with patient and collaboration with interdisciplinary care team.  Loreta Jim, KIRSTY  [facility]  Access via [address] [place] [address]  [phone] (cell)

## 2020-07-10 NOTE — PROGRESS NOTES
General Surgery Daily Progress Note    Admit Date: 2020  Post-Operative Day: 3 Days Post-Op from Procedure(s):  DRAINAGE ABDOMINAL WALL  ABCESS     Subjective:     Last 24 hrs: Pt pre-medicated for wound care. Nervous. No other issues       Objective:     Blood pressure 158/89, pulse 65, temperature 98.5 °F (36.9 °C), resp. rate 16, height 6' 2\" (1.88 m), weight 214 lb 15.2 oz (97.5 kg), SpO2 98 %. Temp (24hrs), Av.7 °F (37.1 °C), Min:98.5 °F (36.9 °C), Max:98.9 °F (37.2 °C)      _____________________  Physical Exam:     Alert and Oriented, x3, in no acute distress. Cardiovascular: RRR, no peripheral edema  Abdomen: soft, wound w/ pink tissue - irrigated; tracks a few cm; minimal purulent drainage      Assessment:   Active Problems:    S/P laparoscopic appendectomy (2020)      Abscess (2020)            Plan:     Cont abx  DM mgmt  dvt proph  OOB     Data Review:    Recent Labs     07/10/20  0211 07/09/20  0259 20  0333   WBC 13.5* 18.7* 27.3*   HGB 12.5 11.3* 11.6*   HCT 40.0 36.2* 37.0   * 566* 556*     Recent Labs     07/10/20  0211 07/08/20  0333   * 134*   K 3.5 4.1    103   CO2 25 25   * 127*   BUN 10 14   CREA 0.74 0.69*   CA 8.9 9.0   MG  --  2.3   PHOS  --  3.2     No results for input(s): AML, LPSE in the last 72 hours.         ______________________  Medications:    Current Facility-Administered Medications   Medication Dose Route Frequency    docusate sodium (COLACE) capsule 100 mg  100 mg Oral BID    polyethylene glycol (MIRALAX) packet 17 g  17 g Oral DAILY    enoxaparin (LOVENOX) injection 40 mg  40 mg SubCUTAneous Q24H    vancomycin (VANCOCIN) 1500 mg in  ml infusion  1,500 mg IntraVENous Q8H    sodium chloride (NS) flush 5-40 mL  5-40 mL IntraVENous Q8H    sodium chloride (NS) flush 5-40 mL  5-40 mL IntraVENous PRN    acetaminophen (TYLENOL) tablet 1,000 mg  1,000 mg Oral Q6H PRN    oxyCODONE IR (ROXICODONE) tablet 5 mg  5 mg Oral Q4H PRN    oxyCODONE IR (ROXICODONE) tablet 10 mg  10 mg Oral Q4H PRN    ondansetron (ZOFRAN ODT) tablet 4 mg  4 mg Oral Q6H PRN    glucose chewable tablet 16 g  4 Tab Oral PRN    glucagon (GLUCAGEN) injection 1 mg  1 mg IntraMUSCular PRN    dextrose 10% infusion 0-250 mL  0-250 mL IntraVENous PRN    insulin lispro (HUMALOG) injection   SubCUTAneous Q6H    vancomycin dosing per pharmacy   Other PRN    morphine injection 2 mg  2 mg IntraVENous Q4H PRN    piperacillin-tazobactam (ZOSYN) 3.375 g in 0.9% sodium chloride (MBP/ADV) 100 mL  3.375 g IntraVENous Q8H       Rowdy Cooper NP  7/10/2020

## 2020-07-10 NOTE — PROGRESS NOTES
ABRIL:    Chart reviewed, all discharge planning has been arranged (see CM note from yesterday). No other needs at this time.     Rox Dancer Helton BSW, ACM

## 2020-07-10 NOTE — PROGRESS NOTES
Bedside and Verbal shift change report given to Maribell Stone (oncoming nurse) by Steff Henderson RN (offgoing nurse). Report included the following information SBAR, Kardex, MAR and Recent Results.

## 2020-07-10 NOTE — DISCHARGE INSTRUCTIONS
Patient Education        Infection After Surgery: Care Instructions  Your Care Instructions  After surgery, an infection is always possible. It doesn't mean that the surgery didn't go well. Because an infection can be serious, your doctor has taken steps to manage it. Your doctor checked the infection and cleaned it if necessary. He or she may have made an opening in the area so that the pus can drain out. You may have gauze in the cut so that the area will stay open and keep draining. You may need antibiotics. You will need to follow up with your doctor to make sure the infection has gone away. Follow-up care is a key part of your treatment and safety. Be sure to make and go to all appointments, and call your doctor if you are having problems. It's also a good idea to know your test results and keep a list of the medicines you take. How can you care for yourself at home? · Make sure your surgeon knows that you saw a doctor about the infection. · If your doctor prescribed antibiotics, take them as directed. Do not stop taking them just because you feel better. You need to take the full course of antibiotics. · Ask your doctor if you can take an over-the-counter pain medicine, such as acetaminophen (Tylenol), ibuprofen (Advil, Motrin), or naproxen (Aleve). Be safe with medicines. Read and follow all instructions on the label. · Do not take two or more pain medicines at the same time unless the doctor told you to. Many pain medicines have acetaminophen, which is Tylenol. Too much acetaminophen (Tylenol) can be harmful. · Prop up the area on a pillow anytime you sit or lie down during the next 3 days. Try to keep it above the level of your heart. This will help reduce swelling. · Keep the skin clean and dry. · If you have a bandage, keep it clean and dry. · You may have a dressing over the cut (incision). A dressing helps the incision heal and protects it.  Your doctor will tell you how to take care of this. You can expect drainage from the wound. · If your doctor told you how to care for your incision, follow your doctor's instructions. If you did not get instructions, follow this general advice:  ? Wash around the incision with clean water 2 times a day. Don't use hydrogen peroxide or alcohol, which can slow healing. When should you call for help? Call your doctor now or seek immediate medical care if:  · You have signs that your infection is getting worse, such as:  ? Increased pain, swelling, warmth, or redness in the area. ? Red streaks leading from the area. ? Pus draining from the wound. ? A new or higher fever. Watch closely for changes in your health, and be sure to contact your doctor if you have any problems. Where can you learn more? Go to http://serjio-néstor.info/  Enter C340 in the search box to learn more about \"Infection After Surgery: Care Instructions. \"  Current as of: June 26, 2019               Content Version: 12.5  © 4245-6309 [employer]. Care instructions adapted under license by [employer] (which disclaims liability or warranty for this information). If you have questions about a medical condition or this instruction, always ask your healthcare professional. [place] [address] any warranty or liability for your use of this information. Diabetes Management:  1. Take a blood glucose reading four times daily:  First thing in the morning before you eat or drink anything. Then before lunch, dinner and bedtime. Make a log and bring to your next doctor appointment. If your blood sugar is over 200 consistently OR   If your blood sugar is under 100 consistently: call your doctor for a medication adjustment   Goal blood sugar is: _70-130, less than 180 after meals_______________    2. .  Goal A1C is 7%. 3. Make a follow up appointment with your endocrinologist or primary care physician.   Please see him within the next 2-4 weeks. 4. Make sure to get a DILATED eye exam every year. This checks for retinal blood vessel damage caused by long term high blood sugar. 5. Make sure to examine your feet every day looking for any wound or foot irritation as sensation can be impaired in your feet. Always wear socks and/or slippers even while at home. This will protect your feet from injury. 6. Diabetes Self Management Training:highly encouraged  Outpatient appointments are available with a certified diabetic educator who can  you with meal planning, insulin administration and other diabetes management strategies. Please call [phone] to schedule at a location close to your home. Patient Discharge Instructions    Sandoval Licona / [id number] : 1985    Admitted 2020 Discharged: 7/10/2020       · It is important that you take the medication exactly as they are prescribed. · Keep your medication in the bottles provided by the pharmacist and keep a list of the medication names, dosages, and times to be taken in your wallet. · Do not take other medications without consulting your doctor. What to do at Home    Recommended diet: Diabetic. Recommended activity: No Heavy Lifting (Less Than 10-15 Pounds). No Driving While Taking Oxycodone. Tylenol 1000mg every 6 hours as needed for pain. Oxycodone as needed for severe pain. May Take Shower or [place] Roxo - Can get open wound wet. Wound care as directed. If you experience any of the following symptoms Fevers, Chills, Nausea, Vomitting, Redness or Drainage at Surgical Site(s) or Any Other Questions or Concerns Please Call -  [phone]. Follow-up with Dr. Pavan Fuentes or Dr. Keanu Frank in 10-14 days. Information obtained by :  I understand that if any problems occur once I am at home I am to contact my physician. I understand and acknowledge receipt of the instructions indicated above. Physician's or R.N.'s Signature                                                                  Date/Time                                                                                                                                              Patient or Representative Signature                                                          Date/Time

## 2020-07-10 NOTE — WOUND CARE
Wound Visit: in with Jessica Blue NP surgery and Dr. Lucho Sun in after dressing removed to reassess lower left abdominal wound. Unice Saliva, patient's wife at bedside. Patient anxious but did find dressing change to be less painful / very tolerable. Teaching Charlie packing / wound care for home. Assessment:  Left lower abdominal wall - 1 x 8 x 4 cm surgically created wound; small 2 cm tract from base of wound near 3 o'clock side. Some seropurulent drainage from this area; otherwise drainage largely serosanguinous. No odor, induration around wound is minimal - noted only at lateral side above / below. No redness in surrounding skin. Treatment/Teaching:  Used 60 cc cath tip syringe to moisten gauze packing in wound to remove. Irrigated wound with same and moistened 2\" kerlex packing gauze as well to repack. Showed Charlie how to use sterile cup for saline to pull up in syringe and clean syringe and cup between dressing changes. Used hydrogel in wound bed; then packed starting with small tunnel area and working way up and side to side in wound to loosely fill. Used composite dressing as outer dressing. Have bin of supplies for home including above and scissors. Wash hands before and after providing care. Clean gloves for handling dressing and repacking encouraged. Plan:  Discharge over weekend to home with daily wound care by patient's wife and follow up Monday with wound center.   Albert Pedroza, RN,CWCN

## 2020-07-11 VITALS
TEMPERATURE: 98.1 F | BODY MASS INDEX: 27.59 KG/M2 | SYSTOLIC BLOOD PRESSURE: 169 MMHG | DIASTOLIC BLOOD PRESSURE: 71 MMHG | RESPIRATION RATE: 18 BRPM | HEIGHT: 74 IN | WEIGHT: 214.95 LBS | HEART RATE: 61 BPM | OXYGEN SATURATION: 98 %

## 2020-07-11 LAB
ANION GAP SERPL CALC-SCNC: 8 MMOL/L (ref 5–15)
BACTERIA SPEC CULT: NORMAL
BASOPHILS # BLD: 0.1 K/UL (ref 0–0.1)
BASOPHILS NFR BLD: 1 % (ref 0–1)
BUN SERPL-MCNC: 9 MG/DL (ref 6–20)
BUN/CREAT SERPL: 14 (ref 12–20)
CALCIUM SERPL-MCNC: 8.9 MG/DL (ref 8.5–10.1)
CHLORIDE SERPL-SCNC: 103 MMOL/L (ref 97–108)
CO2 SERPL-SCNC: 24 MMOL/L (ref 21–32)
CREAT SERPL-MCNC: 0.66 MG/DL (ref 0.7–1.3)
DIFFERENTIAL METHOD BLD: ABNORMAL
EOSINOPHIL # BLD: 0.6 K/UL (ref 0–0.4)
EOSINOPHIL NFR BLD: 5 % (ref 0–7)
ERYTHROCYTE [DISTWIDTH] IN BLOOD BY AUTOMATED COUNT: 12 % (ref 11.5–14.5)
GLUCOSE BLD STRIP.AUTO-MCNC: 109 MG/DL (ref 65–100)
GLUCOSE BLD STRIP.AUTO-MCNC: 137 MG/DL (ref 65–100)
GLUCOSE SERPL-MCNC: 117 MG/DL (ref 65–100)
HCT VFR BLD AUTO: 37.6 % (ref 36.6–50.3)
HGB BLD-MCNC: 11.9 G/DL (ref 12.1–17)
IMM GRANULOCYTES # BLD AUTO: 0.2 K/UL (ref 0–0.04)
IMM GRANULOCYTES NFR BLD AUTO: 2 % (ref 0–0.5)
LYMPHOCYTES # BLD: 2.6 K/UL (ref 0.8–3.5)
LYMPHOCYTES NFR BLD: 20 % (ref 12–49)
MCH RBC QN AUTO: 26 PG (ref 26–34)
MCHC RBC AUTO-ENTMCNC: 31.6 G/DL (ref 30–36.5)
MCV RBC AUTO: 82.3 FL (ref 80–99)
MONOCYTES # BLD: 0.8 K/UL (ref 0–1)
MONOCYTES NFR BLD: 6 % (ref 5–13)
NEUTS SEG # BLD: 9.1 K/UL (ref 1.8–8)
NEUTS SEG NFR BLD: 66 % (ref 32–75)
NRBC # BLD: 0 K/UL (ref 0–0.01)
NRBC BLD-RTO: 0 PER 100 WBC
PLATELET # BLD AUTO: 571 K/UL (ref 150–400)
PMV BLD AUTO: 10.5 FL (ref 8.9–12.9)
POTASSIUM SERPL-SCNC: 3.6 MMOL/L (ref 3.5–5.1)
RBC # BLD AUTO: 4.57 M/UL (ref 4.1–5.7)
SERVICE CMNT-IMP: ABNORMAL
SERVICE CMNT-IMP: ABNORMAL
SERVICE CMNT-IMP: NORMAL
SODIUM SERPL-SCNC: 135 MMOL/L (ref 136–145)
WBC # BLD AUTO: 13.5 K/UL (ref 4.1–11.1)

## 2020-07-11 PROCEDURE — 74011250636 HC RX REV CODE- 250/636: Performed by: SURGERY

## 2020-07-11 PROCEDURE — 85025 COMPLETE CBC W/AUTO DIFF WBC: CPT

## 2020-07-11 PROCEDURE — 74011250637 HC RX REV CODE- 250/637: Performed by: PHYSICIAN ASSISTANT

## 2020-07-11 PROCEDURE — 74011000258 HC RX REV CODE- 258: Performed by: SURGERY

## 2020-07-11 PROCEDURE — 74011250637 HC RX REV CODE- 250/637: Performed by: SURGERY

## 2020-07-11 PROCEDURE — 80048 BASIC METABOLIC PNL TOTAL CA: CPT

## 2020-07-11 PROCEDURE — 36415 COLL VENOUS BLD VENIPUNCTURE: CPT

## 2020-07-11 PROCEDURE — 82962 GLUCOSE BLOOD TEST: CPT

## 2020-07-11 RX ORDER — OXYCODONE AND ACETAMINOPHEN 5; 325 MG/1; MG/1
1 TABLET ORAL
Qty: 30 TAB | Refills: 0 | Status: SHIPPED | OUTPATIENT
Start: 2020-07-11 | End: 2020-07-18

## 2020-07-11 RX ORDER — LEVOFLOXACIN 750 MG/1
750 TABLET ORAL DAILY
Qty: 10 TAB | Refills: 0 | Status: SHIPPED | OUTPATIENT
Start: 2020-07-11 | End: 2020-07-21

## 2020-07-11 RX ORDER — METRONIDAZOLE 500 MG/1
500 TABLET ORAL 3 TIMES DAILY
Qty: 30 TAB | Refills: 0 | Status: SHIPPED | OUTPATIENT
Start: 2020-07-11 | End: 2020-07-21

## 2020-07-11 RX ADMIN — POLYETHYLENE GLYCOL 3350 17 G: 17 POWDER, FOR SOLUTION ORAL at 09:36

## 2020-07-11 RX ADMIN — DOCUSATE SODIUM 100 MG: 100 CAPSULE, LIQUID FILLED ORAL at 09:36

## 2020-07-11 RX ADMIN — PIPERACILLIN AND TAZOBACTAM 3.38 G: 3; .375 INJECTION, POWDER, LYOPHILIZED, FOR SOLUTION INTRAVENOUS at 03:47

## 2020-07-11 RX ADMIN — MORPHINE SULFATE 2 MG: 2 INJECTION, SOLUTION INTRAMUSCULAR; INTRAVENOUS at 09:40

## 2020-07-11 RX ADMIN — PIPERACILLIN AND TAZOBACTAM 3.38 G: 3; .375 INJECTION, POWDER, LYOPHILIZED, FOR SOLUTION INTRAVENOUS at 09:36

## 2020-07-11 RX ADMIN — ACETAMINOPHEN 1000 MG: 500 TABLET ORAL at 10:38

## 2020-07-11 RX ADMIN — ACETAMINOPHEN 1000 MG: 500 TABLET ORAL at 03:52

## 2020-07-11 NOTE — PROGRESS NOTES
Progress Note    Patient: Lelia Chau. MRN: [id number]  SSN: [id number]    YOB: 1985  Age: 30 y.o. Sex: male      Admit Date: 2020    4 Days Post-Op    Procedure:  Procedure(s):  DRAINAGE ABDOMINAL WALL  ABCESS    Subjective:     No acute surgical issues. Pt is doing well. Tolerating diet without nausea or vomiting. Pain is under control. Still mild leukocytosis but patient has been afebrile and no chills.      Objective:     Visit Vitals  /71 (BP 1 Location: Left arm, BP Patient Position: At rest)   Pulse 61   Temp 98.1 °F (36.7 °C)   Resp 18   Ht 6' 2\" (1.88 m)   Wt 214 lb 15.2 oz (97.5 kg)   SpO2 98%   BMI 27.60 kg/m²       Temp (24hrs), Av.3 °F (36.8 °C), Min:98 °F (36.7 °C), Max:98.6 °F (37 °C)        Physical Exam:    Gen:  NAD  Pulm:  Unlabored  Abd:  S/ND/appropriate TTP  Wound to LLQ:  Pink without malodor or purulent drainage    Recent Results (from the past 24 hour(s))   GLUCOSE, POC    Collection Time: 07/10/20 11:04 AM   Result Value Ref Range    Glucose (POC) 182 (H) 65 - 100 mg/dL    Performed by Nima Hurtado    GLUCOSE, POC    Collection Time: 07/10/20  6:14 PM   Result Value Ref Range    Glucose (POC) 136 (H) 65 - 100 mg/dL    Performed by Nima Hurtado    GLUCOSE, POC    Collection Time: 07/10/20 11:30 PM   Result Value Ref Range    Glucose (POC) 117 (H) 65 - 100 mg/dL    Performed by TREVOR CEBALLOS JR. [facility]  PCT    METABOLIC PANEL, BASIC    Collection Time: 20  3:58 AM   Result Value Ref Range    Sodium 135 (L) 136 - 145 mmol/L    Potassium 3.6 3.5 - 5.1 mmol/L    Chloride 103 97 - 108 mmol/L    CO2 24 21 - 32 mmol/L    Anion gap 8 5 - 15 mmol/L    Glucose 117 (H) 65 - 100 mg/dL    BUN 9 6 - 20 MG/DL    Creatinine 0.66 (L) 0.70 - 1.30 MG/DL    BUN/Creatinine ratio 14 12 - 20      GFR est AA >60 >60 ml/min/1.73m2    GFR est non-AA >60 >60 ml/min/1.73m2    Calcium 8.9 8.5 - 10.1 MG/DL   CBC WITH AUTOMATED DIFF    Collection Time: 20  3:58 AM Result Value Ref Range    WBC 13.5 (H) 4.1 - 11.1 K/uL    RBC 4.57 4.10 - 5.70 M/uL    HGB 11.9 (L) 12.1 - 17.0 g/dL    HCT 37.6 36.6 - 50.3 %    MCV 82.3 80.0 - 99.0 FL    MCH 26.0 26.0 - 34.0 PG    MCHC 31.6 30.0 - 36.5 g/dL    RDW 12.0 11.5 - 14.5 %    PLATELET 130 (H) 678 - 400 K/uL    MPV 10.5 8.9 - 12.9 FL    NRBC 0.0 0  WBC    ABSOLUTE NRBC 0.00 0.00 - 0.01 K/uL    NEUTROPHILS 66 32 - 75 %    LYMPHOCYTES 20 12 - 49 %    MONOCYTES 6 5 - 13 %    EOSINOPHILS 5 0 - 7 %    BASOPHILS 1 0 - 1 %    IMMATURE GRANULOCYTES 2 (H) 0.0 - 0.5 %    ABS. NEUTROPHILS 9.1 (H) 1.8 - 8.0 K/UL    ABS. LYMPHOCYTES 2.6 0.8 - 3.5 K/UL    ABS. MONOCYTES 0.8 0.0 - 1.0 K/UL    ABS. EOSINOPHILS 0.6 (H) 0.0 - 0.4 K/UL    ABS. BASOPHILS 0.1 0.0 - 0.1 K/UL    ABS. IMM.  GRANS. 0.2 (H) 0.00 - 0.04 K/UL    DF AUTOMATED     GLUCOSE, POC    Collection Time: 07/11/20  5:28 AM   Result Value Ref Range    Glucose (POC) 109 (H) 65 - 100 mg/dL    Performed by Kaci Regan  PCT          Assessment:     Hospital Problems  Date Reviewed: 7/7/2020          Codes Class Noted POA    S/P laparoscopic appendectomy ICD-10-CM: Z90.49  ICD-9-CM: V45.89  7/6/2020 Unknown        Abscess ICD-10-CM: L02.91  ICD-9-CM: 682.9  7/6/2020 Unknown              Plan/Recommendations/Medical Decision Making:     - diet as tolerated  - pain control  - encourage ambulation  - local wound care to abdominal incision  - plan dc home today with oral antibiotic and local wound care

## 2020-07-11 NOTE — PROGRESS NOTES
Bedside and Verbal shift change report given to Gómez Argueta RN (oncoming nurse) by Linn Salguero RN (offgoing nurse). Report included the following information SBAR, Kardex, OR Summary, MAR and Recent Results.

## 2020-07-13 ENCOUNTER — HOSPITAL ENCOUNTER (OUTPATIENT)
Dept: WOUND CARE | Age: 35
Discharge: HOME | End: 2020-07-13
Payer: MEDICAID

## 2020-07-13 VITALS
SYSTOLIC BLOOD PRESSURE: 151 MMHG | DIASTOLIC BLOOD PRESSURE: 100 MMHG | HEART RATE: 89 BPM | WEIGHT: 222 LBS | RESPIRATION RATE: 18 BRPM | HEIGHT: 74 IN | BODY MASS INDEX: 28.49 KG/M2

## 2020-07-13 PROCEDURE — 99212 OFFICE O/P EST SF 10 MIN: CPT

## 2020-07-13 PROCEDURE — 99213 OFFICE O/P EST LOW 20 MIN: CPT

## 2020-07-13 NOTE — WOUND CARE
29 yo aa male referred to us for management of a wound secondary to I&D of an apparent post-op abscess occurring after a lap appendx. Pt notes that there had been considerable pain and \"bile\" coming out. \"  Has been doing well systemically since the I&D, with some little need for narcotic (mostly for fear of pain with wound dressings), some nervousness about \"popping\" loose. On antibiotics. PMH diet controlled DM  Meds: percocet, levaquin and metronidazole    SH no smoking. Recent vegetarian changes to diet  FH no help    ROS Uniformly negative    Alert, oriented and conversant. Visit Vitals  BP (!) 151/100 (BP 1 Location: Left arm, BP Patient Position: At rest)   Pulse 89   Resp 18   Ht 6' 2\" (1.88 m)   Wt 100.7 kg (222 lb)   BMI 28.50 kg/m²     Anicteric. No jvd. Regular pulse. Easy respiration  No edema    Wound  Location: LLQ  Etiology: post op I&D  Size: per nursing notes  ,  ,  1x8x3. 8  Margins: flat  Periwound: benign   Undermining: no  Tunneling or sinus: no  Drainage: none  Odor: none  Base: pink,   Probe to bone: no  Crepitus or fluctuance: no  Debridement today:none. Aquacel. Foam. Every other day-q3d. RTC 1 week   Patient knows we are available in the interim  for questions or developing erythema, edema, warmth or pain. Suggested protein/calorie supplementation to diet. Attention to sugars.     Z90.49  L02.91  E11.9

## 2020-07-13 NOTE — DISCHARGE INSTRUCTIONS
Discharge Instructions for  [address]  Telephone: [phone]    NAME:  Khloe Sanchez. YOB: 1985  MEDICAL RECORD NUMBER:  [id number]  DATE:  7/13/2020       Dressings:           Wound Location ABD  Cleanse with NS. Pack with aquacel ag, cover with gauze and ABD. Change every other day, or as needed. Dietary:  [x] Diet as tolerated: [x] Increase Protein:    Activity:  [x] Activity as tolerated:              Return Appointment:  [x] Return Appointment: With Gerald Santos  in  [facility])     Electronically signed on 7/13/2020 at [address]: Should you experience any significant changes in your wound(s) or have questions about your wound care, please contact the [facility] Main at [address] [place] [address] 8:00 am - 4:30. If you need help with your wound outside these hours and cannot wait until we are again available, contact your PCP or go to the hospital emergency room. PLEASE NOTE: IF YOU ARE UNABLE TO OBTAIN WOUND SUPPLIES, CONTINUE TO USE THE SUPPLIES YOU HAVE AVAILABLE UNTIL YOU ARE ABLE TO REACH US. IT IS MOST IMPORTANT TO KEEP THE WOUND COVERED AT ALL TIMES.       Physician Signature:_______________________    Date: ___________ Time:  ____________

## 2020-07-13 NOTE — WOUND CARE
07/13/20 0826   Wound Abdomen Left   Date First Assessed/Time First Assessed: 07/13/20 0825   Present on Hospital Admission: No  Primary Wound Type: Abscess  Location: Abdomen  Wound Location Orientation: Left   Dressing Status Breakthrough drainage   Dressing Type Packing;ABD pad   Wound Length (cm) 1 cm   Wound Width (cm) 8 cm   Wound Depth (cm) 3.8 cm   Wound Surface Area (cm^2) 8 cm^2   Wound Volume (cm^3) 30.4 cm^3   Tunneling (cm)   (3 oclock 2.3 cm , 9 oclock 2.1)   Drainage Amount Moderate   Drainage Color Serosanguinous   Wound Odor None   Darling-wound Assessment Intact   Cleansing and Cleansing Agents  Normal saline     Visit Vitals  BP (!) 151/100 (BP 1 Location: Left arm, BP Patient Position: At rest)   Pulse 89   Resp 18   Ht 6' 2\" (1.88 m)   Wt 100.7 kg (222 lb)   BMI 28.50 kg/m²

## 2020-07-16 NOTE — DISCHARGE SUMMARY
Physician Discharge Summary     Patient ID:  Markos Koch  [id number]  37 y.o.  1985    Allergies: Patient has no known allergies. Admit Date: 7/6/2020    Discharge Date: 7/11/2020    * Admission Diagnoses: Abscess [L02.91]    * Discharge Diagnoses:    Hospital Problems as of 7/11/2020 Date Reviewed: 7/7/2020          Codes Class Noted - Resolved POA    S/P laparoscopic appendectomy ICD-10-CM: Z90.49  ICD-9-CM: V45.89  7/6/2020 - Present Unknown        Abscess ICD-10-CM: L02.91  ICD-9-CM: 682.9  7/6/2020 - Present Unknown               Admission Condition: Fair    * Discharge Condition: Improved    * Procedures: Drain Abdominal Wall Abscess - 7/7/2020    * Hospital Course:   Normal hospital course for this procedure. Consults: Diabetes Management    Significant Diagnostic Studies: radiology: CT scan: Abdomen/Pelvis - 7/7/2020    * Disposition: Home    Discharge Medications:   Discharge Medication List as of 7/11/2020  1:29 PM      CONTINUE these medications which have CHANGED    Details   oxyCODONE-acetaminophen (PERCOCET) 5-325 mg per tablet Take 1 Tab by mouth every six (6) hours as needed for Pain for up to 7 days. Max Daily Amount: 4 Tabs., Print, Disp-30 Tab,R-0      levoFLOXacin (LEVAQUIN) 750 mg tablet Take 1 Tab by mouth daily for 10 days. , Print, Disp-10 Tab,R-0      metroNIDAZOLE (FLAGYL) 500 mg tablet Take 1 Tab by mouth three (3) times daily for 10 days. , Print, Disp-30 Tab,R-0         STOP taking these medications       amoxicillin-clavulanate (AUGMENTIN) 875-125 mg per tablet Comments:   Reason for Stopping:               * Follow-up Care/Patient Instructions:   Activity: Activity as tolerated  Diet: Diabetic Diet  Wound Care: As directed    Follow-up Information     Follow up With Specialties Details Why Contact Info    Nava Marinelli NP Family Practice In 2 weeks Continue diabetes management post hospitalization [address] [facility] [address] [phone]      [facility] OP WOUND CARE JUNIOR Wound Care Go on 7/13/2020 Appt. time 8:15am Monday 7/13/2020 [address] [place] [address] Drive [address] [place] [id number] [phone]    Vashti Mahan MD General Surgery In 2 weeks  [address] [facility] [address]  [phone]          Follow-up tests/labs None.     Signed:  Arnie Myers MD  7/16/2020  11:40 AM

## 2020-07-20 ENCOUNTER — HOSPITAL ENCOUNTER (OUTPATIENT)
Dept: WOUND CARE | Age: 35
Discharge: HOME | End: 2020-07-20
Payer: MEDICAID

## 2020-07-20 VITALS
DIASTOLIC BLOOD PRESSURE: 94 MMHG | SYSTOLIC BLOOD PRESSURE: 160 MMHG | TEMPERATURE: 96.7 F | RESPIRATION RATE: 18 BRPM | HEART RATE: 106 BPM

## 2020-07-20 PROCEDURE — 99213 OFFICE O/P EST LOW 20 MIN: CPT

## 2020-07-20 NOTE — WOUND CARE
Visit Vitals  BP (!) 160/94 (BP 1 Location: Left arm, BP Patient Position: At rest)   Pulse (!) 106   Temp (!) 96.7 °F (35.9 °C)   Resp 18        07/20/20 0950   Wound Abdomen Left   Date First Assessed/Time First Assessed: 07/13/20 0825   Present on Hospital Admission: No  Primary Wound Type: Abscess  Location: Abdomen  Wound Location Orientation: Left   Dressing Status Breakthrough drainage   Dressing Type Aquacel;Gauze wrap (aleida);ABD pad   Wound Length (cm) 1 cm   Wound Width (cm) 7.5 cm   Wound Depth (cm) 3.5 cm   Wound Surface Area (cm^2) 7.5 cm^2   Wound Volume (cm^3) 26.25 cm^3   Change in Wound Size % 6.25   Condition of Base Granulation   Drainage Amount Moderate   Drainage Color Serosanguinous   Wound Odor None   Darling-wound Assessment Intact   Cleansing and Cleansing Agents  Normal saline

## 2020-07-20 NOTE — DISCHARGE INSTRUCTIONS
Discharge Instructions for  [address], [address]  Telephone: [phone]    NAME:  Karissa Bright. YOB: 1985  MEDICAL RECORD NUMBER:  [id number]  DATE:  7/20/2020       Dressings:           Wound Location Abdomen  Cleanse with NS. Pack with aquacel ag, cover with gauze and ABD. Change every other day, or as needed. Dietary:  [x] Diet as tolerated: [x] Increase Protein:      Activity:  [x] Activity as tolerated:  Avoid \"sit-up\" movements. Return Appointment:  [x] Return Appointment: With Abel Peters  in  1 [facility])     Electronically signed on 7/20/2020 at [address] [place] [address] Dr: Should you experience any significant changes in your wound(s) or have questions about your wound care, please contact the [facility] Main at [address] [place] [address] 8:00 am - 4:30. If you need help with your wound outside these hours and cannot wait until we are again available, contact your PCP or go to the hospital emergency room. PLEASE NOTE: IF YOU ARE UNABLE TO OBTAIN WOUND SUPPLIES, CONTINUE TO USE THE SUPPLIES YOU HAVE AVAILABLE UNTIL YOU ARE ABLE TO REACH US. IT IS MOST IMPORTANT TO KEEP THE WOUND COVERED AT ALL TIMES.       Physician Signature:_______________________    Date: ___________ Time:  ____________

## 2020-07-20 NOTE — WOUND CARE
Followup for an ulcer after surgery. He and Mrs are happy with absence of pain, little drainage, relief of the \"impending dehiscence\" feeling. No intercurrent illnesses, systemic symptoms, new complaints or changes in meds. Has agreed to eat meat to get a high protein/high jose alberto;orie diet. Alert, oriented and conversant. Visit Vitals  BP (!) 160/94 (BP 1 Location: Left arm, BP Patient Position: At rest)   Pulse (!) 106   Temp (!) 96.7 °F (35.9 °C)   Resp 18     Wound  Location: LLQ  Etiology: postop  Size: per nursing notes  ,  ,  1x7.5x3.5  Margins: flat  Periwound: benign   Undermining: no  Tunneling or sinus: no  Drainage: liquified aquacel  Odor: none  Base: beefy, granular  Probe to bone: no  Crepitus or fluctuance: no  Debridement today:none. Discussed possible NPWT if healing flags  Aquacel today and q  Day to every other day as needed  RTC 1 week  Patient and caregiver(s) know we are available in the interim  for questions or developing erythema, edema, warmth or pain.     Z90.49  L02.91  E11.9

## 2020-07-20 NOTE — WOUND CARE
07/20/20 1011   Wound Abdomen Left   Date First Assessed/Time First Assessed: 07/13/20 0825   Present on Hospital Admission: No  Primary Wound Type: Abscess  Location: Abdomen  Wound Location Orientation: Left   Dressing Type Applied Alginate;Gauze;ABD pad       Discharge Condition: Stable     Pain: 0    Ambulatory Status: Walking    Discharge Destination: Home     Transportation: Car    Accompanied by: Self  and Family/Caregiver     Discharge instructions reviewed with Patient and Family/Caregiver  and copy or written instructions have been provided. All questions/concerns have been addressed at this time.

## 2020-07-21 ENCOUNTER — OFFICE VISIT (OUTPATIENT)
Dept: SURGERY | Age: 35
End: 2020-07-21

## 2020-07-21 VITALS
OXYGEN SATURATION: 99 % | BODY MASS INDEX: 28.23 KG/M2 | SYSTOLIC BLOOD PRESSURE: 132 MMHG | DIASTOLIC BLOOD PRESSURE: 86 MMHG | WEIGHT: 220 LBS | RESPIRATION RATE: 16 BRPM | HEIGHT: 74 IN | HEART RATE: 77 BPM | TEMPERATURE: 98.3 F

## 2020-07-21 DIAGNOSIS — L02.91 ABSCESS: Primary | ICD-10-CM

## 2020-07-21 NOTE — PROGRESS NOTES
1. Have you been to the ER, urgent care clinic since your last visit? Hospitalized since your last visit? No    2. Have you seen or consulted any other health care providers outside of the [address] [place] [address] since your last visit? Include any pap smears or colon screening.  No

## 2020-07-21 NOTE — PROGRESS NOTES
Floresita Selby is a 37 y.o. male who returns for a wound check. Mr. Mike Colbert is s/p laparoscopic appendectomy on June 25, 2020. He developed a post operative wound infection at the LLQ trocar site and is s/p drainage of the abdominal wall abscess on July 7, 2020. Doing well since discharge from hospital. No complaints today. No problems with local wound care and is followed at the [address] [place] [address]. No fevers or chills. No nausea or vomitting. He completed antibiotics as prescribed. He has otherwise been in his usual state of health. Past Medical History:   Diagnosis Date    Abscess 07/06/2020    Diabetes mellitus type 2, controlled, without complications ([facility] 27/69/5866    S/P laparoscopic appendectomy 07/06/2020    w/ post-op abscess     Past Surgical History:   Procedure Laterality Date    HX APPENDECTOMY  06/25/2020     Family History   Problem Relation Age of Onset    Hypertension Father     Diabetes Maternal Grandmother      Social History     Socioeconomic History    Marital status:      Spouse name: Not on file    Number of children: Not on file    Years of education: Not on file    Highest education level: Not on file   Tobacco Use    Smoking status: Never Smoker    Smokeless tobacco: Never Used   Substance and Sexual Activity    Alcohol use: No    Drug use: No   Other Topics Concern   Social History Narrative    ** Merged History Encounter **          Review of systems negative except as noted. Review of Systems   Constitutional: Negative for chills and fever. Gastrointestinal: Negative for abdominal pain, nausea and vomiting. Physical Exam  Vitals signs reviewed. Constitutional:       General: He is not in acute distress. Appearance: Normal appearance. He is normal weight. HENT:      Head: Normocephalic and atraumatic. Cardiovascular:      Rate and Rhythm: Normal rate and regular rhythm.    Pulmonary:      Effort: Pulmonary effort is normal. Abdominal:      General: There is no distension. Palpations: Abdomen is soft. Tenderness: There is no abdominal tenderness. There is no guarding or rebound. Musculoskeletal: Normal range of motion. Skin:     Comments: The LLQ abdominal wall wound is clean and granulating. There is no purulent or enteric appearing drainage. No surrounding cellulitis or induration. Neurological:      General: No focal deficit present. Mental Status: He is alert. ASSESSMENT and PLAN  Wound repacked with saline soaked nu-gauze. Reassured Mr. Flaco Ojeda that he is doing well and that the wound is healing nicely. Continue local wound care as before. Told Mr. Flaco Ojeda that he can get the open wound wet. Do not believe that there is a need for further abx therapy at this time. Activity as tolerated. Follow up at wound healing center as scheduled. Follow up with Ms. Ernst as scheduled. Will see in 2-3 more weks or earlier if need be. He is agreeable to this plan and is most certainly free to contact the office should any questions or concerns arise.        CC: FREDDY Lee MD

## 2020-07-27 ENCOUNTER — HOSPITAL ENCOUNTER (OUTPATIENT)
Dept: WOUND CARE | Age: 35
Discharge: HOME | End: 2020-07-27
Payer: MEDICAID

## 2020-07-27 VITALS
WEIGHT: 222 LBS | RESPIRATION RATE: 16 BRPM | SYSTOLIC BLOOD PRESSURE: 148 MMHG | HEIGHT: 74 IN | DIASTOLIC BLOOD PRESSURE: 96 MMHG | HEART RATE: 87 BPM | BODY MASS INDEX: 28.49 KG/M2 | TEMPERATURE: 97.9 F

## 2020-07-27 PROCEDURE — 99212 OFFICE O/P EST SF 10 MIN: CPT

## 2020-07-27 RX ORDER — DOCUSATE SODIUM 100 MG/1
100 CAPSULE, LIQUID FILLED ORAL AS NEEDED
COMMUNITY

## 2020-07-27 RX ORDER — POLYETHYLENE GLYCOL 3350 17 G/17G
17 POWDER, FOR SOLUTION ORAL AS NEEDED
COMMUNITY

## 2020-07-27 NOTE — DISCHARGE INSTRUCTIONS
Discharge Instructions for  [address]  Telephone: [phone]    NAME:  Floresita Selby YOB: 1985  MEDICAL RECORD NUMBER:  [id number]  DATE:  7/27/2020     Dressings:           Wound Location Abdomen  Cleanse with NS. Pack with Shelley to base of wound and cover with  aquacel ag, cover with gauze and ABD. Change every other day, or as needed. Dietary:  [x] Diet as tolerated: [x] Increase Protein:      Activity:  [x] Activity as tolerated:              Return Appointment:  [x] Return Appointment: With Lyubov Ovens   in  1 Week(s) at [address]     Electronically signed on 7/27/2020 at 09:58 AM Bryan Tenorio Information: Should you experience any significant changes in your wound(s) or have questions about your wound care, please contact the [facility] at [address] [place] [address] 8:00 am - 4:30. If you need help with your wound outside these hours and cannot wait until we are again available, contact your PCP or go to the hospital emergency room. PLEASE NOTE: IF YOU ARE UNABLE TO OBTAIN WOUND SUPPLIES, CONTINUE TO USE THE SUPPLIES YOU HAVE AVAILABLE UNTIL YOU ARE ABLE TO REACH US. IT IS MOST IMPORTANT TO KEEP THE WOUND COVERED AT ALL TIMES.       Physician Signature:_______________________    Date: ___________ Time:  ____________

## 2020-07-27 NOTE — WOUND CARE
07/27/20 0929   Anesthetic   Anesthetic 4% Lidocaine Cream   Wound Abdomen Left   Date First Assessed/Time First Assessed: 07/13/20 0825   Present on Hospital Admission: No  Primary Wound Type: Abscess  Location: Abdomen  Wound Location Orientation: Left   Dressing Status Clean, dry, and intact   Dressing Type Aquacel   Non-staged Wound Description Full thickness   Wound Length (cm) 0.1 cm   Wound Width (cm) 6.5 cm   Wound Depth (cm) 2 cm   Wound Surface Area (cm^2) 0.65 cm^2   Wound Volume (cm^3) 1.3 cm^3   Change in Wound Size % 91.88   Condition of Base Granulation   Drainage Amount Moderate   Drainage Color Serosanguinous   Wound Odor None   Darling-wound Assessment Intact   Cleansing and Cleansing Agents  Normal saline     Visit Vitals  BP (!) 148/96 (BP 1 Location: Left arm, BP Patient Position: At rest)   Pulse 87   Temp 97.9 °F (36.6 °C)   Resp 16   Ht 6' 2\" (1.88 m)   Wt 100.7 kg (222 lb)   BMI 28.50 kg/m²

## 2020-07-27 NOTE — WOUND CARE
07/27/20 0945   Wound Abdomen Left   Date First Assessed/Time First Assessed: 07/13/20 0825   Present on Hospital Admission: No  Primary Wound Type: Abscess  Location: Abdomen  Wound Location Orientation: Left   Wound Length (cm) 0.4 cm   Wound Width (cm) 6.7 cm   Wound Depth (cm) 5 cm   Wound Surface Area (cm^2) 2.68 cm^2   Wound Volume (cm^3) 13.4 cm^3   Change in Wound Size % 66.5   Condition of Base Granulation   Drainage Amount Moderate   Drainage Color Serosanguinous   Wound Odor None   Darling-wound Assessment Intact   Cleansing and Cleansing Agents  Normal saline   Dressing Type Applied Collagens/cell matrix; Alginate; Foam       Discharge Condition: Stable     Pain: 0    Ambulatory Status: Walking    Discharge Destination: Home     Transportation: Car    Accompanied by: Self  and Family/Caregiver     Discharge instructions reviewed with Patient and Family/Caregiver  and copy or written instructions have been provided.  All questions/concerns have been addressed at this time.               '

## 2020-07-27 NOTE — WOUND CARE
Followup for an ulcer/slow healling wound after surgery. No pain. Little bleeding or discharge. No intercurrent illnesses, systemic symptoms, new complaints or changes in meds. Mrs says he's eating well and being compliant with diabetic restrictions. Alert, oriented and conversant. Visit Vitals  BP (!) 148/96 (BP 1 Location: Left arm, BP Patient Position: At rest)   Pulse 87   Temp 97.9 °F (36.6 °C)   Resp 16   Ht 6' 2\" (1.88 m)   Wt 100.7 kg (222 lb)   BMI 28.50 kg/m²     Wound  Location:LLQ  Etiology: post op  Size: per nursing notes  ,  ,  6.5cmx0.1cm (at rest)x2cm with a deeper area to 4.5 or 5 more laterally  Margins: flat  Periwound: benign   Undermining: no  Tunneling or sinus: no  Drainage: serosanguineous  Odor: none  Base: red, granular  Probe to bone: no  Crepitus or fluctuance: no  Debridement today:none. Change to more \"structure\". Shelley every other day. RTC 1 week-10 days. Patient and caregiver(s) know we are available in the interim  for questions or developing erythema, edema, warmth or pain.     Z90.49  L02.91  E11.9

## 2020-08-06 ENCOUNTER — HOSPITAL ENCOUNTER (OUTPATIENT)
Dept: WOUND CARE | Age: 35
Discharge: HOME | End: 2020-08-06
Admitting: INTERNAL MEDICINE
Payer: MEDICAID

## 2020-08-06 ENCOUNTER — OFFICE VISIT (OUTPATIENT)
Dept: SURGERY | Age: 35
End: 2020-08-06
Payer: SELF-PAY

## 2020-08-06 VITALS
TEMPERATURE: 97.3 F | HEART RATE: 89 BPM | DIASTOLIC BLOOD PRESSURE: 95 MMHG | RESPIRATION RATE: 19 BRPM | SYSTOLIC BLOOD PRESSURE: 162 MMHG

## 2020-08-06 VITALS
WEIGHT: 222.6 LBS | HEART RATE: 105 BPM | BODY MASS INDEX: 28.58 KG/M2 | TEMPERATURE: 99 F | RESPIRATION RATE: 19 BRPM | SYSTOLIC BLOOD PRESSURE: 137 MMHG | OXYGEN SATURATION: 98 % | DIASTOLIC BLOOD PRESSURE: 89 MMHG

## 2020-08-06 DIAGNOSIS — T81.49XA WOUND INFECTION AFTER SURGERY: Primary | ICD-10-CM

## 2020-08-06 PROCEDURE — 99213 OFFICE O/P EST LOW 20 MIN: CPT

## 2020-08-06 PROCEDURE — 99024 POSTOP FOLLOW-UP VISIT: CPT | Performed by: SURGERY

## 2020-08-06 NOTE — PROGRESS NOTES
Patient is here for follow-up of his abdominal wound. He continues to go to the wound care center weekly. He has no new concerns or complaints. Visit Vitals  /89 (BP 1 Location: Left arm, BP Patient Position: Sitting)   Pulse (!) 105   Temp 99 °F (37.2 °C) (Oral)   Resp 19   Wt 222 lb 9.6 oz (101 kg)   SpO2 98%   BMI 28.58 kg/m²     General alert no acute distress  Abdomen the abdominal wound has good granulation tissue and is almost completely healed. Assessment  Status post laparoscopic appendectomy complicated by abdominal wound infection with drainage. Appears to be doing well. Would continue with current dressing changes per wound care center. Does not need to follow-up here as long as he is being seen in the wound care center.

## 2020-08-06 NOTE — WOUND CARE
08/06/20 1452   Wound Abdomen Left   Date First Assessed/Time First Assessed: 07/13/20 0825   Present on Hospital Admission: No  Primary Wound Type: Abscess  Location: Abdomen  Wound Location Orientation: Left   Dressing Status Removed   Dressing Type Foam  (Aquacel Ag)   Non-staged Wound Description Full thickness   Wound Length (cm) 0.1 cm   Wound Width (cm) 5.2 cm   Wound Depth (cm) 3 cm   Wound Surface Area (cm^2) 0.52 cm^2   Wound Volume (cm^3) 1.56 cm^3   Change in Wound Size % 93.5   Condition of Base Pink   Condition of Edges Open   Drainage Amount Moderate   Drainage Color Serous   Wound Odor None   Darling-wound Assessment Intact   Cleansing and Cleansing Agents  Normal saline       Visit Vitals  BP (!) 162/95 (BP 1 Location: Right arm, BP Patient Position: At rest) Comment: No headache nor dizziness.    Pulse 89   Temp 97.3 °F (36.3 °C)   Resp 19

## 2020-08-06 NOTE — DISCHARGE INSTRUCTIONS
Discharge Instructions/Wound Orders  [facility]  [address]  [address] [facility] [address], [address]  Telephone: [phone]    NAME:  Peter Gould. YOB: 1985  MEDICAL RECORD NUMBER:  [id number]  DATE:  8/6/2020    Wound Care Orders:  Cleanse with NS. Pack with Shelley to base of wound and cover with border foam. Return to clinic in 2 weeks for MD follow-up. Dietary:  [x] Diet as tolerated: [x] Calorie Diabetic Diet: [] No Added Salt:  [x] Increase Protein: [] Other:Limit the amount of liquid you are drinking and avoid drinking in between meals   Activity:  [x] Activity as tolerated:  [] Patient has no activity restrictions     [] Strict Bedrest: [] Remain off Work:     [] May return to full duty work:                                   [] Return to work with restrictions:             Return Appointment:  [] Wound and dressing supply provider:   [] ECF or Home Healthcare:  [] Wound Assessment: [] Physician or NP scheduled for Wound Assessment:   [x] Return Appointment: With DR Heath Sapp  in  [facility])  [] Ordered tests:      Electronically signed Kelsi Tavarez RN on 8/6/2020 at 3:10 PM     Loraine Ortega 281: Should you experience any significant changes in your wound(s) or have questions about your wound care, please contact the [address] [place] [address] at [address] [place] [address] 8:00 am - 4:30. If you need help with your wound outside these hours and cannot wait until we are again available, contact your PCP or go to the hospital emergency room. PLEASE NOTE: IF YOU ARE UNABLE TO OBTAIN WOUND SUPPLIES, CONTINUE TO USE THE SUPPLIES YOU HAVE AVAILABLE UNTIL YOU ARE ABLE TO REACH US. IT IS MOST IMPORTANT TO KEEP THE WOUND COVERED AT ALL TIMES.      Physician Signature:_______________________    Date: ___________ Time:  ____________

## 2020-08-06 NOTE — PROGRESS NOTES
1. Have you been to the ER, urgent care clinic since your last visit? Hospitalized since your last visit? No    2. Have you seen or consulted any other health care providers outside of the [address] [place] [address] since your last visit? Include any pap smears or colon screening.  No

## 2020-08-06 NOTE — WOUND CARE
08/06/20 1522   Wound Abdomen Left   Date First Assessed/Time First Assessed: 07/13/20 0825   Present on Hospital Admission: No  Primary Wound Type: Abscess  Location: Abdomen  Wound Location Orientation: Left   Dressing Type Applied Collagens/cell matrix; Special tape (comment); Foam

## 2020-08-07 NOTE — WOUND CARE
Followup for an ulcer after  Surgery - an I&D for postop abscess. No intercurrent illnesses, systemic symptoms, new complaints or changes in meds. Confirmed by wife    Alert, oriented and conversant. Maybe a bit anxious    Visit Vitals  BP (!) 162/95 (BP 1 Location: Right arm, BP Patient Position: At rest) Comment: No headache nor dizziness. Pulse 89   Temp 97.3 °F (36.3 °C)   Resp 19     Wound  Location:LLQ  Etiology: postop  Size: per nursing notes  ,  ,  5.2x0. 1x3 (3.8 by me at a tiny sinus)  Margins: flat  Periwound: benign   Undermining: no  Tunneling or sinus: as noted  Drainage: none  Odor: none  Base: red, granular  Probe to bone: no  Crepitus or fluctuance: no  Debridement today:none. Simplify wound to wilmer tiw. Patient and caregiver(s) know we are available in the interim  for questions or developing erythema, edema, warmth or pain.     RTC 2 weeks    Z90.49   L02.91  E11.9

## 2020-08-20 ENCOUNTER — HOSPITAL ENCOUNTER (OUTPATIENT)
Dept: WOUND CARE | Age: 35
Discharge: HOME | End: 2020-08-20
Admitting: INTERNAL MEDICINE
Payer: MEDICAID

## 2020-08-20 VITALS
DIASTOLIC BLOOD PRESSURE: 103 MMHG | HEART RATE: 103 BPM | RESPIRATION RATE: 16 BRPM | SYSTOLIC BLOOD PRESSURE: 164 MMHG | TEMPERATURE: 97.3 F

## 2020-08-20 PROCEDURE — 99213 OFFICE O/P EST LOW 20 MIN: CPT

## 2020-08-20 NOTE — WOUND CARE
08/20/20 1604   Wound Abdomen Left   Date First Assessed/Time First Assessed: 07/13/20 0825   Present on Hospital Admission: No  Primary Wound Type: Abscess  Location: Abdomen  Wound Location Orientation: Left   Dressing Type Applied Collagens/cell matrix;Silver products; Foam

## 2020-08-20 NOTE — WOUND CARE
08/20/20 1523   Wound Abdomen Left   Date First Assessed/Time First Assessed: 07/13/20 0825   Present on Hospital Admission: No  Primary Wound Type: Abscess  Location: Abdomen  Wound Location Orientation: Left   Dressing Status Removed   Dressing Type Foam   Wound Length (cm) 0.2 cm   Wound Width (cm) 3.2 cm   Wound Depth (cm) 0.4 cm   Wound Surface Area (cm^2) 0.64 cm^2   Wound Volume (cm^3) 0.26 cm^3   Change in Wound Size % 92   Condition of Base Pink   Condition of Edges Open   Drainage Amount Moderate   Drainage Color Serous   Wound Odor None   Darling-wound Assessment Intact   Cleansing and Cleansing Agents  Normal saline     Visit Vitals  BP (!) 164/103   Pulse (!) 103   Temp 97.3 °F (36.3 °C)   Resp 16

## 2020-08-20 NOTE — DISCHARGE INSTRUCTIONS
Discharge Instructions/Wound Orders  [facility]  [address], [address] [facility]  Telephone: [phone]    NAME:  Qamar Javed. YOB: 1985  MEDICAL RECORD NUMBER:  [id number]  DATE:  8/20/2020    Wound Care Orders:  Cleanse with NS. Apply  with Shelley to base of wound and cover with border foam. Return to clinic in 3 weeks for MD follow-up. Dietary:  [x] Diet as tolerated: [] Calorie Diabetic Diet: [] No Added Salt:  [x] Increase Protein: [] Other:Limit the amount of liquid you are drinking and avoid drinking in between meals   Activity:  [x] Activity as tolerated:  [] Patient has no activity restrictions     [] Strict Bedrest: [] Remain off Work:     [] May return to full duty work:                                   [] Return to work with restrictions:             Return Appointment:  [] Wound and dressing supply provider:   [] ECF or Home Healthcare:  [] Wound Assessment: [] Physician or NP scheduled for Wound Assessment:   [x] Return Appointment: With DR Katie Cullen  in  3 Week(s)  [] Ordered tests:      Electronically signed Veronica Syed RN on 8/20/2020 at 3:49 PM     Loraine Ortega 281: Should you experience any significant changes in your wound(s) or have questions about your wound care, please contact the [address] [place] [address] at [address] [place] [address] 8:00 am - 4:30. If you need help with your wound outside these hours and cannot wait until we are again available, contact your PCP or go to the hospital emergency room. PLEASE NOTE: IF YOU ARE UNABLE TO OBTAIN WOUND SUPPLIES, CONTINUE TO USE THE SUPPLIES YOU HAVE AVAILABLE UNTIL YOU ARE ABLE TO REACH US. IT IS MOST IMPORTANT TO KEEP THE WOUND COVERED AT ALL TIMES.      Physician Signature:_______________________    Date: ___________ Time:  ____________

## 2020-08-21 NOTE — WOUND CARE
Followup for an ulcer after surgery. No intercurrent illnesses, systemic symptoms, new complaints or changes in meds. Wife concurs - says he can even bend over to  a shirt now! She commends his effort at intelligent diet for his DM as well. Alert, oriented and conversant. Visit Vitals  BP (!) 164/103   Pulse (!) 103   Temp 97.3 °F (36.3 °C)   Resp 16     Wound  Location:abdomen  Etiology: post I&D  Size: per nursing notes  ,  ,  3.2x0.2x0.4  Margins: flat  Periwound: benign   Undermining: no  Tunneling or sinus: no  Drainage: none  Odor: none  Base: pink, granular  Probe to bone: no  Crepitus or fluctuance: no  Debridement today:none. Nice outcome, with closure of the \"cleft\" in the base. Z90.49  L02.91  E11.9    Continue Aquacel so long as it will \"fit. \"  RTC 3 weeks unless closed. Have suggested against strenuous activity (specifically sweat-inducing) until closed. Patient and caregiver(s) know we are available in the interim  for questions or developing erythema, edema, warmth or pain.

## 2021-03-17 NOTE — PROGRESS NOTES
Wound is healing.  Uses Upper denture. Not at the bedside. Patient's wife took the denture home with her./upper

## 2022-09-08 ENCOUNTER — HOSPITAL ENCOUNTER (EMERGENCY)
Age: 37
Discharge: HOME | End: 2022-09-08
Attending: EMERGENCY MEDICINE
Payer: MEDICAID

## 2022-09-08 ENCOUNTER — APPOINTMENT (OUTPATIENT)
Dept: GENERAL RADIOLOGY | Age: 37
End: 2022-09-08
Attending: EMERGENCY MEDICINE
Payer: MEDICAID

## 2022-09-08 VITALS
HEIGHT: 74 IN | SYSTOLIC BLOOD PRESSURE: 151 MMHG | WEIGHT: 240 LBS | RESPIRATION RATE: 16 BRPM | DIASTOLIC BLOOD PRESSURE: 110 MMHG | HEART RATE: 92 BPM | BODY MASS INDEX: 30.8 KG/M2 | OXYGEN SATURATION: 100 %

## 2022-09-08 DIAGNOSIS — M25.511 ACUTE PAIN OF RIGHT SHOULDER: Primary | ICD-10-CM

## 2022-09-08 PROCEDURE — 99284 EMERGENCY DEPT VISIT MOD MDM: CPT

## 2022-09-08 PROCEDURE — 73030 X-RAY EXAM OF SHOULDER: CPT

## 2022-09-08 PROCEDURE — 74011250637 HC RX REV CODE- 250/637: Performed by: PHYSICIAN ASSISTANT

## 2022-09-08 PROCEDURE — 96372 THER/PROPH/DIAG INJ SC/IM: CPT

## 2022-09-08 PROCEDURE — 74011250636 HC RX REV CODE- 250/636: Performed by: PHYSICIAN ASSISTANT

## 2022-09-08 RX ORDER — KETOROLAC TROMETHAMINE 30 MG/ML
15 INJECTION, SOLUTION INTRAMUSCULAR; INTRAVENOUS
Status: COMPLETED | OUTPATIENT
Start: 2022-09-08 | End: 2022-09-08

## 2022-09-08 RX ORDER — CYCLOBENZAPRINE HCL 10 MG
10 TABLET ORAL
Status: COMPLETED | OUTPATIENT
Start: 2022-09-08 | End: 2022-09-08

## 2022-09-08 RX ORDER — CYCLOBENZAPRINE HCL 5 MG
10 TABLET ORAL
Qty: 15 TABLET | Refills: 0 | Status: SHIPPED | OUTPATIENT
Start: 2022-09-08

## 2022-09-08 RX ORDER — OXYCODONE AND ACETAMINOPHEN 5; 325 MG/1; MG/1
1 TABLET ORAL
Status: COMPLETED | OUTPATIENT
Start: 2022-09-08 | End: 2022-09-08

## 2022-09-08 RX ORDER — NAPROXEN 500 MG/1
500 TABLET ORAL
Qty: 20 TABLET | Refills: 0 | Status: SHIPPED | OUTPATIENT
Start: 2022-09-08

## 2022-09-08 RX ADMIN — OXYCODONE AND ACETAMINOPHEN 1 TABLET: 5; 325 TABLET ORAL at 10:11

## 2022-09-08 RX ADMIN — CYCLOBENZAPRINE 10 MG: 10 TABLET, FILM COATED ORAL at 10:11

## 2022-09-08 RX ADMIN — KETOROLAC TROMETHAMINE 15 MG: 30 INJECTION, SOLUTION INTRAMUSCULAR at 10:13

## 2022-09-08 NOTE — Clinical Note
Kerry Rose was seen and treated in our emergency department on 9/8/2022. Please excuse Greg Goldstein. From work until 9/11/22.             Azeem Vaz

## 2022-09-08 NOTE — DISCHARGE INSTRUCTIONS
Start Naproxen and Flexeril. Do not drive or operate machinery on Flexeril.   Rest, ice, elevate, perform shoulder ROM  Follow up with ortho, return to ED for any new or worsening symptoms

## 2022-09-08 NOTE — ED PROVIDER NOTES
EMERGENCY DEPARTMENT HISTORY AND PHYSICAL EXAM      Date: 9/8/2022  Patient Name: Darwin Lynch. History of Presenting Illness     Chief Complaint   Patient presents with    Shoulder Pain     Woke up today, atraumatic right shoulder pain, limited movement. Pain worsens with movement       History Provided By: Patient    HPI: Darwin Lynch., 30 y.o. male presents to the ED with cc of right shoulder pain since yesterday morning. 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 He denies any known trauma or injury, but notes he was performing these duties the day prior to the pain beginning. He woke with the pain. The shoulder pain is located to the anterior joint and is currently rated 7/10, 10/10 with movement. The pain is described as a constant throbbing that is non-radiating. Nothing seems to make the pain better. The patient woke with further stiffness in the joint today compared to yesterday. The patient denies medicating the pain. The patient denies a history of similar symptoms and does not follow an orthopedist. The patient denies F/C, cough congestion, CP, SOB, abdominal pain, N/V/D, extremity weakness, and extremity numbness. PMH: DMII  Allergies: no known allergies  SH: pt denies alcohol, tobacco, and illicit drug use    There are no other complaints, changes, or physical findings at this time. PCP: Regulo Felix NP    No current facility-administered medications on file prior to encounter. Current Outpatient Medications on File Prior to Encounter   Medication Sig Dispense Refill    polyethylene glycol (Miralax) 17 gram packet Take 17 g by mouth as needed for Constipation. docusate sodium (Colace) 100 mg capsule Take 100 mg by mouth as needed for Constipation.          Past History     Past Medical History:  Past Medical History:   Diagnosis Date    Abscess 07/06/2020    Diabetes mellitus type 2, controlled, without complications ([facility] [address] 76/27/1298    S/P laparoscopic appendectomy 07/06/2020    w/ post-op abscess       Past Surgical History:  Past Surgical History:   Procedure Laterality Date    HX APPENDECTOMY  06/25/2020       Family History:  Family History   Problem Relation Age of Onset    Hypertension Father     Diabetes Maternal Grandmother        Social History:  Social History     Tobacco Use    Smoking status: Never    Smokeless tobacco: Never   Substance Use Topics    Alcohol use: No    Drug use: No       Allergies:  No Known Allergies      Review of Systems   Review of Systems   Constitutional: Negative. Negative for chills and fever. HENT: Negative. Negative for congestion. Respiratory: Negative. Negative for cough and shortness of breath. Cardiovascular: Negative. Negative for chest pain and palpitations. Gastrointestinal: Negative. Negative for abdominal pain, diarrhea, nausea and vomiting. Musculoskeletal:  Positive for arthralgias. Negative for myalgias. Allergic/Immunologic: Negative. Negative for environmental allergies and food allergies. Neurological: Negative. Negative for weakness and numbness. Psychiatric/Behavioral: Negative. Physical Exam   Physical Exam  Vitals reviewed. Constitutional:       Appearance: He is well-developed. He is not diaphoretic. Comments: Pt is awake and alert in mild distress secondary to pain. HENT:      Head: Normocephalic and atraumatic. Right Ear: Tympanic membrane, ear canal and external ear normal.      Left Ear: Tympanic membrane, ear canal and external ear normal.      Nose: Nose normal.   Eyes:      General:         Right eye: No discharge. Left eye: No discharge. Conjunctiva/sclera: Conjunctivae normal.      Pupils: Pupils are equal, round, and reactive to light. Cardiovascular:      Rate and Rhythm: Normal rate. Pulses:           Radial pulses are 2+ on the right side and 2+ on the left side. Heart sounds: Normal heart sounds.    Pulmonary:      Effort: Pulmonary effort is normal. No respiratory distress. Breath sounds: Normal breath sounds. No wheezing or rales. Abdominal:      Tenderness: There is no guarding. Musculoskeletal:         General: No tenderness. Comments: R shoulder: shoulders appear symmetrical, patient actively guarding right shoulder. No edema, erythema, or obvious bony deformity. No TTP. Minimal active ROM secondary to discomfort. Passive flexion ROM 90deg, passive abduction ROM 90deg. Lymphadenopathy:      Cervical: No cervical adenopathy. Skin:     General: Skin is warm and dry. Coloration: Skin is not pale. Findings: No erythema or rash. Neurological:      General: No focal deficit present. Mental Status: He is alert. Coordination: Coordination normal.      Comments: No focal neuro deficits.  strength 5/5 bilaterally. Sensation intact to upper extremities bilaterally. Psychiatric:         Behavior: Behavior normal.       Diagnostic Study Results     Labs -   No results found for this or any previous visit (from the past 12 hour(s)). Radiologic Studies -   XR SHOULDER RT AP/LAT MIN 2 V   Final Result   No acute abnormality. CT Results  (Last 48 hours)      None          CXR Results  (Last 48 hours)      None            Medical Decision Making   I am the first provider for this patient. I reviewed the vital signs, available nursing notes, past medical history, past surgical history, family history and social history. Vital Signs-Reviewed the patient's vital signs. Patient Vitals for the past 12 hrs:   Pulse Resp BP SpO2   09/08/22 0904 92 16 (!) 151/110 100 %       Records Reviewed: Old Medical Records    Provider Notes (Medical Decision Making):   30year old male with DMII presents with right shoulder pain x 1 day. Probable strain/sprain vs arthritis. R/out fracture and dislocation. Can not rule out internal derangement. Advised follow up with ortho.      ED Course:   Initial assessment performed. The patients presenting problems have been discussed, and they are in agreement with the care plan formulated and outlined with them. I have encouraged them to ask questions as they arise throughout their visit. Disposition:  10:10 AM    DC-The patient and spouse was given verbal follow-up instructions      DISCHARGE PLAN:  1. Current Discharge Medication List        START taking these medications    Details   naproxen (NAPROSYN) 500 mg tablet Take 1 Tablet by mouth every twelve (12) hours as needed for Pain. Qty: 20 Tablet, Refills: 0  Start date: 9/8/2022      cyclobenzaprine (FLEXERIL) 5 mg tablet Take 2 Tablets by mouth three (3) times daily as needed for Muscle Spasm(s). Qty: 15 Tablet, Refills: 0  Start date: 9/8/2022           2. Follow-up Information    None       3. Return to ED if worse     Diagnosis     Clinical Impression:   1. Acute pain of right shoulder        Attestations:    GUILLERMO [place]        Please note that this dictation was completed with [employer], the computer voice recognition software. Quite often unanticipated grammatical, syntax, homophones, and other interpretive errors are inadvertently transcribed by the computer software. Please disregard these errors. Please excuse any errors that have escaped final proofreading. Thank you.

## (undated) DEVICE — INFECTION CONTROL KIT SYS

## (undated) DEVICE — TAPE DRSG RETEN HYPFX 2INX10YD --

## (undated) DEVICE — REM POLYHESIVE ADULT PATIENT RETURN ELECTRODE: Brand: VALLEYLAB

## (undated) DEVICE — Device

## (undated) DEVICE — SUTURE MCRYL SZ 4-0 L27IN ABSRB UD L19MM PS-2 1/2 CIR PRIM Y426H

## (undated) DEVICE — GAUZE,SPONGE,2"X2",8PLY,STERILE,LF,2'S: Brand: MEDLINE

## (undated) DEVICE — STAPLER INT L340MM 45MM STD 12 FIRING B FRM PWR + GRIPPING

## (undated) DEVICE — STRAP,POSITIONING,KNEE/BODY,FOAM,4X60": Brand: MEDLINE

## (undated) DEVICE — TROCAR: Brand: KII® OPTICAL ACCESS SYSTEM

## (undated) DEVICE — TROCAR: Brand: KII® SLEEVE

## (undated) DEVICE — SOLUTION IV 1000ML 0.9% SOD CHL

## (undated) DEVICE — 3M™ TEGADERM™ TRANSPARENT FILM DRESSING FRAME STYLE, 1626W, 4 IN X 4-3/4 IN (10 CM X 12 CM), 50/CT 4CT/CASE: Brand: 3M™ TEGADERM™

## (undated) DEVICE — SURGICAL PROCEDURE KIT GEN LAPAROSCOPY LF

## (undated) DEVICE — CORDLESS ULTRASONIC DISSECTOR: Brand: SONICISION

## (undated) DEVICE — AIR SHEET,LAT,COMFORT GLIDE, BLEND 40X80: Brand: MEDLINE

## (undated) DEVICE — NEEDLE HYPO 25GA L1.5IN BVL ORIENTED ECLIPSE

## (undated) DEVICE — GARMENT,MEDLINE,DVT,INT,CALF,MED, GEN2: Brand: MEDLINE

## (undated) DEVICE — STERILE POLYISOPRENE POWDER-FREE SURGICAL GLOVES WITH EMOLLIENT COATING: Brand: PROTEXIS

## (undated) DEVICE — INSUFFLATION NEEDLE TO ESTABLISH PNEUMOPERITONEUM.: Brand: INSUFFLATION NEEDLE

## (undated) DEVICE — SURGICAL PROCEDURE PACK BASIN MAJ SET CUST NO CAUT

## (undated) DEVICE — SOLUTION IRRIG 3000ML 0.9% SOD CHL FLX CONT 0797208] ICU MEDICAL INC]

## (undated) DEVICE — PACK,BASIC,SIRUS,V: Brand: MEDLINE

## (undated) DEVICE — ROCKER SWITCH PENCIL BLADE ELECTRODE, HOLSTER: Brand: EDGE

## (undated) DEVICE — TOTAL TRAY, DB, 100% SILI FOLEY, 16FR 10: Brand: MEDLINE

## (undated) DEVICE — ENDOLOOP SUT LIGATURE 18IN -- 12/BX PDS II

## (undated) DEVICE — RELOAD STPL L45MM H1-2.6MM MESENTERY THN TISS WHT GRIPPING

## (undated) DEVICE — TOWEL SURG W17XL27IN STD BLU COT NONFENESTRATED PREWASHED

## (undated) DEVICE — HANDPIECE SET WITH BONE CLEANING TIP AND SUCTION TUBE: Brand: INTERPULSE

## (undated) DEVICE — PREP SKN CHLRAPRP APL 26ML STR --

## (undated) DEVICE — STRIP,CLOSURE,WOUND,MEDI-STRIP,1/2X4: Brand: MEDLINE

## (undated) DEVICE — TROCAR: Brand: KII FIOS FIRST ENTRY

## (undated) DEVICE — SPONGE GZ W4XL4IN COT 12 PLY TYP VII WVN C FLD DSGN

## (undated) DEVICE — BAG SPEC REM 224ML W4XL6IN DIA10MM 1 HND GYN DISP ENDOPCH

## (undated) DEVICE — PAD,ABDOMINAL,5"X9",ST,LF,25/BX: Brand: MEDLINE INDUSTRIES, INC.